# Patient Record
Sex: FEMALE | Race: WHITE | Employment: FULL TIME | URBAN - METROPOLITAN AREA
[De-identification: names, ages, dates, MRNs, and addresses within clinical notes are randomized per-mention and may not be internally consistent; named-entity substitution may affect disease eponyms.]

---

## 2017-01-11 ENCOUNTER — HOSPITAL ENCOUNTER (EMERGENCY)
Facility: HOSPITAL | Age: 42
Discharge: HOME | End: 2017-01-11
Attending: EMERGENCY MEDICINE
Payer: COMMERCIAL

## 2017-01-11 ENCOUNTER — APPOINTMENT (OUTPATIENT)
Dept: GENERAL RADIOLOGY | Facility: HOSPITAL | Age: 42
End: 2017-01-11
Attending: EMERGENCY MEDICINE
Payer: COMMERCIAL

## 2017-01-11 VITALS
BODY MASS INDEX: 31 KG/M2 | HEART RATE: 74 BPM | DIASTOLIC BLOOD PRESSURE: 97 MMHG | OXYGEN SATURATION: 100 % | TEMPERATURE: 97 F | SYSTOLIC BLOOD PRESSURE: 132 MMHG | RESPIRATION RATE: 16 BRPM | WEIGHT: 195 LBS

## 2017-01-11 DIAGNOSIS — S46.912A SHOULDER STRAIN, LEFT, INITIAL ENCOUNTER: ICD-10-CM
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
## 2017-01-11 PROCEDURE — 99283 EMERGENCY DEPT VISIT LOW MDM: CPT

## 2017-01-11 PROCEDURE — 73020 X-RAY EXAM OF SHOULDER: CPT

## 2017-01-11 RX ORDER — CYCLOBENZAPRINE HCL 10 MG
10 TABLET ORAL 3 TIMES DAILY PRN
Qty: 20 TABLET | Refills: 0 | Status: SHIPPED | OUTPATIENT
Start: 2017-01-11 | End: 2017-01-18

## 2017-01-11 RX ORDER — IBUPROFEN 600 MG/1
600 TABLET ORAL ONCE
Status: COMPLETED | OUTPATIENT
Start: 2017-01-11 | End: 2017-01-11
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
## 2017-04-30 ENCOUNTER — HOSPITAL ENCOUNTER (EMERGENCY)
Facility: HOSPITAL | Age: 42
Discharge: HOME | End: 2017-04-30
Attending: EMERGENCY MEDICINE
Payer: COMMERCIAL

## 2017-04-30 VITALS
HEIGHT: 66 IN | OXYGEN SATURATION: 97 % | SYSTOLIC BLOOD PRESSURE: 128 MMHG | RESPIRATION RATE: 16 BRPM | HEART RATE: 72 BPM | WEIGHT: 200 LBS | BODY MASS INDEX: 32.14 KG/M2 | DIASTOLIC BLOOD PRESSURE: 80 MMHG | TEMPERATURE: 98 F

## 2017-04-30 DIAGNOSIS — G43.809 OTHER MIGRAINE WITHOUT STATUS MIGRAINOSUS, NOT INTRACTABLE: Primary | ICD-10-CM

## 2017-04-30 PROCEDURE — 96375 TX/PRO/DX INJ NEW DRUG ADDON: CPT

## 2017-04-30 PROCEDURE — 96374 THER/PROPH/DIAG INJ IV PUSH: CPT

## 2017-04-30 PROCEDURE — 99284 EMERGENCY DEPT VISIT MOD MDM: CPT

## 2017-04-30 RX ORDER — METOCLOPRAMIDE HYDROCHLORIDE 5 MG/ML
10 INJECTION INTRAMUSCULAR; INTRAVENOUS ONCE
Status: COMPLETED | OUTPATIENT
Start: 2017-04-30 | End: 2017-04-30

## 2017-04-30 RX ORDER — KETOROLAC TROMETHAMINE 30 MG/ML
30 INJECTION, SOLUTION INTRAMUSCULAR; INTRAVENOUS ONCE
Status: COMPLETED | OUTPATIENT
Start: 2017-04-30 | End: 2017-04-30

## 2017-04-30 RX ORDER — DIPHENHYDRAMINE HYDROCHLORIDE 50 MG/ML
25 INJECTION INTRAMUSCULAR; INTRAVENOUS ONCE
Status: COMPLETED | OUTPATIENT
Start: 2017-04-30 | End: 2017-04-30

## 2017-05-01 NOTE — ED PROVIDER NOTES
Patient Seen in: [facility] Emergency Department    History   Patient presents with:  Headache (neurologic)    Stated Complaint: headache    HPI    41-year-old female with history of recurrent headaches presents with onset of a headache earlier toda %   O2 Device 04/30/17 5726 None (Room air)       Current:/83 mmHg  Pulse 74  Temp(Src) 98 °F (36.7 °C) (Oral)  Resp 16  Ht 167.6 cm (5' 6\")  Wt 90.719 kg  BMI 32.30 kg/m2  SpO2 98%  LMP 04/10/2017        Physical Exam    General:  Vitals as listed. appointment as soon as possible for a visit        Medications Prescribed:  Current Discharge Medication List

## 2017-05-01 NOTE — ED NOTES
STATES FEEL MUCH BETTER--RE-EVAL PER MD AND OK FOR DC WITH FU INST TO P;MD IN 1-2 DAYS AS DIRECTED.  IN AGREEMENT WITH POC.  QUESTIONS ANSWERED PER NICK TEJEDA AMB + GAIT WITH SPOUSE

## 2017-09-18 ENCOUNTER — APPOINTMENT (OUTPATIENT)
Dept: ULTRASOUND IMAGING | Facility: HOSPITAL | Age: 42
End: 2017-09-18
Attending: PHYSICIAN ASSISTANT
Payer: COMMERCIAL

## 2017-09-18 ENCOUNTER — HOSPITAL ENCOUNTER (EMERGENCY)
Facility: HOSPITAL | Age: 42
Discharge: HOME | End: 2017-09-18
Attending: EMERGENCY MEDICINE
Payer: COMMERCIAL

## 2017-09-18 VITALS
TEMPERATURE: 98 F | DIASTOLIC BLOOD PRESSURE: 68 MMHG | WEIGHT: 200 LBS | SYSTOLIC BLOOD PRESSURE: 112 MMHG | HEIGHT: 67 IN | RESPIRATION RATE: 18 BRPM | HEART RATE: 58 BPM | OXYGEN SATURATION: 98 % | BODY MASS INDEX: 31.39 KG/M2

## 2017-09-18 DIAGNOSIS — K80.20 CALCULUS OF GALLBLADDER WITHOUT CHOLECYSTITIS WITHOUT OBSTRUCTION: Primary | ICD-10-CM

## 2017-09-18 DIAGNOSIS — N30.00 ACUTE CYSTITIS WITHOUT HEMATURIA: ICD-10-CM

## 2017-09-18 LAB
ALBUMIN SERPL-MCNC: 3.2 G/DL (ref 3.5–4.8)
ALP LIVER SERPL-CCNC: 48 U/L (ref 37–98)
ALT SERPL-CCNC: 26 U/L (ref 14–54)
AST SERPL-CCNC: 18 U/L (ref 15–41)
BASOPHILS # BLD AUTO: 0.04 X10(3) UL (ref 0–0.1)
BASOPHILS NFR BLD AUTO: 0.5 %
BILIRUB SERPL-MCNC: 1.3 MG/DL (ref 0.1–2)
BILIRUB UR QL STRIP.AUTO: NEGATIVE
BUN BLD-MCNC: 20 MG/DL (ref 8–20)
CALCIUM BLD-MCNC: 8.6 MG/DL (ref 8.3–10.3)
CHLORIDE: 107 MMOL/L (ref 101–111)
CO2: 24 MMOL/L (ref 22–32)
COLOR UR AUTO: YELLOW
CREAT BLD-MCNC: 1.19 MG/DL (ref 0.55–1.02)
EOSINOPHIL # BLD AUTO: 0.14 X10(3) UL (ref 0–0.3)
EOSINOPHIL NFR BLD AUTO: 1.7 %
ERYTHROCYTE [DISTWIDTH] IN BLOOD BY AUTOMATED COUNT: 12.8 % (ref 11.5–16)
GLUCOSE BLD-MCNC: 129 MG/DL (ref 70–99)
GLUCOSE UR STRIP.AUTO-MCNC: NEGATIVE MG/DL
HCT VFR BLD AUTO: 41.2 % (ref 34–50)
HGB BLD-MCNC: 13.9 G/DL (ref 12–16)
IMMATURE GRANULOCYTE COUNT: 0.02 X10(3) UL (ref 0–1)
IMMATURE GRANULOCYTE RATIO %: 0.2 %
KETONES UR STRIP.AUTO-MCNC: NEGATIVE MG/DL
LIPASE: 194 U/L (ref 73–393)
LYMPHOCYTES # BLD AUTO: 3.42 X10(3) UL (ref 0.9–4)
LYMPHOCYTES NFR BLD AUTO: 40.8 %
M PROTEIN MFR SERPL ELPH: 7.2 G/DL (ref 6.1–8.3)
MCH RBC QN AUTO: 29.3 PG (ref 27–33.2)
MCHC RBC AUTO-ENTMCNC: 33.7 G/DL (ref 31–37)
MCV RBC AUTO: 86.9 FL (ref 81–100)
MONOCYTES # BLD AUTO: 0.51 X10(3) UL (ref 0.1–0.6)
MONOCYTES NFR BLD AUTO: 6.1 %
NEUTROPHIL ABS PRELIM: 4.26 X10 (3) UL (ref 1.3–6.7)
NEUTROPHILS # BLD AUTO: 4.26 X10(3) UL (ref 1.3–6.7)
NEUTROPHILS NFR BLD AUTO: 50.7 %
NITRITE UR QL STRIP.AUTO: NEGATIVE
PH UR STRIP.AUTO: 5 [PH] (ref 4.5–8)
PLATELET # BLD AUTO: 266 10(3)UL (ref 150–450)
POCT LOT NUMBER: NORMAL
POCT URINE PREGNANCY: NEGATIVE
POTASSIUM SERPL-SCNC: 3.6 MMOL/L (ref 3.6–5.1)
PROT UR STRIP.AUTO-MCNC: NEGATIVE MG/DL
RBC # BLD AUTO: 4.74 X10(6)UL (ref 3.8–5.1)
RED CELL DISTRIBUTION WIDTH-SD: 40.3 FL (ref 35.1–46.3)
SODIUM SERPL-SCNC: 139 MMOL/L (ref 136–144)
SP GR UR STRIP.AUTO: 1.03 (ref 1–1.03)
UROBILINOGEN UR STRIP.AUTO-MCNC: 2 MG/DL
WBC # BLD AUTO: 8.4 X10(3) UL (ref 4–13)

## 2017-09-18 PROCEDURE — 99285 EMERGENCY DEPT VISIT HI MDM: CPT

## 2017-09-18 PROCEDURE — 80053 COMPREHEN METABOLIC PANEL: CPT | Performed by: EMERGENCY MEDICINE

## 2017-09-18 PROCEDURE — 85025 COMPLETE CBC W/AUTO DIFF WBC: CPT | Performed by: EMERGENCY MEDICINE

## 2017-09-18 PROCEDURE — 96361 HYDRATE IV INFUSION ADD-ON: CPT

## 2017-09-18 PROCEDURE — 83690 ASSAY OF LIPASE: CPT

## 2017-09-18 PROCEDURE — 85025 COMPLETE CBC W/AUTO DIFF WBC: CPT

## 2017-09-18 PROCEDURE — 96374 THER/PROPH/DIAG INJ IV PUSH: CPT

## 2017-09-18 PROCEDURE — 87086 URINE CULTURE/COLONY COUNT: CPT | Performed by: EMERGENCY MEDICINE

## 2017-09-18 PROCEDURE — 76700 US EXAM ABDOM COMPLETE: CPT | Performed by: PHYSICIAN ASSISTANT

## 2017-09-18 PROCEDURE — 83690 ASSAY OF LIPASE: CPT | Performed by: EMERGENCY MEDICINE

## 2017-09-18 PROCEDURE — 99284 EMERGENCY DEPT VISIT MOD MDM: CPT

## 2017-09-18 PROCEDURE — 81025 URINE PREGNANCY TEST: CPT

## 2017-09-18 PROCEDURE — 80053 COMPREHEN METABOLIC PANEL: CPT

## 2017-09-18 PROCEDURE — 81001 URINALYSIS AUTO W/SCOPE: CPT | Performed by: EMERGENCY MEDICINE

## 2017-09-18 RX ORDER — SULFAMETHOXAZOLE AND TRIMETHOPRIM 800; 160 MG/1; MG/1
1 TABLET ORAL 2 TIMES DAILY
Qty: 6 TABLET | Refills: 0 | Status: SHIPPED | OUTPATIENT
Start: 2017-09-18 | End: 2017-09-21

## 2017-09-18 RX ORDER — KETOROLAC TROMETHAMINE 30 MG/ML
30 INJECTION, SOLUTION INTRAMUSCULAR; INTRAVENOUS ONCE
Status: COMPLETED | OUTPATIENT
Start: 2017-09-18 | End: 2017-09-18

## 2017-09-18 RX ORDER — HYDROMORPHONE HYDROCHLORIDE 1 MG/ML
0.5 INJECTION, SOLUTION INTRAMUSCULAR; INTRAVENOUS; SUBCUTANEOUS ONCE
Status: DISCONTINUED | OUTPATIENT
Start: 2017-09-18 | End: 2017-09-19

## 2017-09-18 RX ORDER — ONDANSETRON 2 MG/ML
4 INJECTION INTRAMUSCULAR; INTRAVENOUS ONCE
Status: DISCONTINUED | OUTPATIENT
Start: 2017-09-18 | End: 2017-09-19

## 2017-09-18 RX ORDER — SODIUM CHLORIDE 9 MG/ML
INJECTION, SOLUTION INTRAVENOUS CONTINUOUS
Status: DISCONTINUED | OUTPATIENT
Start: 2017-09-18 | End: 2017-09-19

## 2017-09-19 ENCOUNTER — OFFICE VISIT (OUTPATIENT)
Dept: SURGERY | Facility: CLINIC | Age: 42
End: 2017-09-19

## 2017-09-19 VITALS
TEMPERATURE: 98 F | BODY MASS INDEX: 32.49 KG/M2 | HEART RATE: 56 BPM | WEIGHT: 202.19 LBS | SYSTOLIC BLOOD PRESSURE: 122 MMHG | DIASTOLIC BLOOD PRESSURE: 78 MMHG | HEIGHT: 66 IN

## 2017-09-19 DIAGNOSIS — R07.81 COSTAL MARGIN PAIN: ICD-10-CM

## 2017-09-19 DIAGNOSIS — K80.10 CALCULUS OF GALLBLADDER WITH CHOLECYSTITIS WITHOUT BILIARY OBSTRUCTION, UNSPECIFIED CHOLECYSTITIS ACUITY: Primary | ICD-10-CM

## 2017-09-19 DIAGNOSIS — K76.89 FOCAL NODULAR HYPERPLASIA OF LIVER: ICD-10-CM

## 2017-09-19 PROCEDURE — 99204 OFFICE O/P NEW MOD 45 MIN: CPT | Performed by: COLON & RECTAL SURGERY

## 2017-09-19 NOTE — ED INITIAL ASSESSMENT (HPI)
Pt with right upper abd pain starting this am with workout. Continued to have pain through the day. Worse with eating. Denies vomiting/nausea.

## 2017-09-19 NOTE — ED PROVIDER NOTES
Patient Seen in: [facility] Emergency Department    History   Patient presents with:  Abdomen/Flank Pain (GI/)    Stated Complaint: ABD PAIN    HPI    CHIEF COMPLAINT: Right upper quadrant abdominal pain    HISTORY OF PRESENT ILLNESS: Patient is a multiple myeloma   • Diabetes Mother    • Diabetes Paternal Grandmother    • Heart Disorder Maternal Grandmother 76     CAD       Smoking status: Never Smoker                                                              Smokeless tobacco: Never Used warm and dry, no rashes. No jaundice. Brisk capillary refill  Musculoskeletal: neck is supple, no lymphadenopathy, non tender. no meningeal signs.        Extremities are symmetrical, full range of motion  Psychiatric: there is no agitation       ED Course MRI of the liver is recommended with Eovist. Differential considerations include benign and malignant hepatic neoplasms. 2. There is cholelithiasis. The gallbladder is contracted and the wall is mildly thickened.  Hepatobiliary scan recommended if there is

## 2017-09-19 NOTE — PATIENT INSTRUCTIONS
Assessment   Calculus of gallbladder with cholecystitis without biliary obstruction, unspecified cholecystitis acuity  (primary encounter diagnosis)  Costal margin pain  Focal nodular hyperplasia    The patient does have right upper quadrant abdominal pain constant right upper quadrant abdominal pain, she should notify the office immediately and/or return to the emergency department where she could be reassessed and undergo urgent surgery prior to her scheduled laparoscopic cholecystectomy date.   The risks o

## 2017-09-19 NOTE — H&P
New Patient Visit Note       Active Problems      1. Calculus of gallbladder with cholecystitis without biliary obstruction, unspecified cholecystitis acuity    2.  Costal margin pain        Chief Complaint   Right upper quadrant abdominal pain    History o nodular hyperplasia of the liver identified on an MRI in 2012. Allergies  Jules Bennett is allergic to biaxin [clarithromycin] and clarithromycin.     Past Medical / Surgical / Social / Family History    The past medical and past surgical history have been review swelling. Gastrointestinal: Positive for abdominal pain. Negative for abdominal distention, anal bleeding, blood in stool, constipation, diarrhea, nausea and vomiting. Genitourinary: Negative for difficulty urinating, dysuria, frequency and urgency. favoring a benign hepatic lesion. . These images are not available for comparison. Correlation of the   sonographic findings with the outside images is recommended which may circumvent the need for additional MRI imaging.            Dictated by: Bartolo Garcia cholecystitis. #3. The pancreas is obscured by overlying bowel gas.          Dictated by: Donald Loving MD on 9/18/2017 at 21:49       Approved by: Donald Loving MD             I have personally reviewed the images from the ultrasound of the abdo do not completely match classic symptomatic cholelithiasis or cholecystitis. The fact that this pain occurred suddenly while performing exercise leads me to believe this is more of a musculoskeletal issue.   It is not likely that the hepatic mass is causing leak, injury to other surrounding structures, need for additional surgeries in the future. No orders of the defined types were placed in this encounter. My total time with this patient was 60 minutes.   Greater than 50% of our visit was spent in co

## 2017-09-20 ENCOUNTER — HOSPITAL ENCOUNTER (INPATIENT)
Facility: HOSPITAL | Age: 42
LOS: 1 days | Discharge: HOME | DRG: 446 | End: 2017-09-21
Attending: EMERGENCY MEDICINE | Admitting: COLON & RECTAL SURGERY
Payer: COMMERCIAL

## 2017-09-20 ENCOUNTER — APPOINTMENT (OUTPATIENT)
Dept: CT IMAGING | Facility: HOSPITAL | Age: 42
DRG: 446 | End: 2017-09-20
Attending: PHYSICIAN ASSISTANT
Payer: COMMERCIAL

## 2017-09-20 ENCOUNTER — APPOINTMENT (OUTPATIENT)
Dept: NUCLEAR MEDICINE | Facility: HOSPITAL | Age: 42
DRG: 446 | End: 2017-09-20
Attending: PHYSICIAN ASSISTANT
Payer: COMMERCIAL

## 2017-09-20 DIAGNOSIS — K80.50 BILIARY COLIC: Primary | ICD-10-CM

## 2017-09-20 LAB
ALBUMIN SERPL-MCNC: 3.2 G/DL (ref 3.5–4.8)
ALP LIVER SERPL-CCNC: 43 U/L (ref 37–98)
ALT SERPL-CCNC: 25 U/L (ref 14–54)
AST SERPL-CCNC: 17 U/L (ref 15–41)
BASOPHILS # BLD AUTO: 0.03 X10(3) UL (ref 0–0.1)
BASOPHILS NFR BLD AUTO: 0.4 %
BILIRUB SERPL-MCNC: 1.4 MG/DL (ref 0.1–2)
BILIRUB UR QL STRIP.AUTO: NEGATIVE
BUN BLD-MCNC: 15 MG/DL (ref 8–20)
CALCIUM BLD-MCNC: 9.3 MG/DL (ref 8.3–10.3)
CHLORIDE: 109 MMOL/L (ref 101–111)
CLARITY UR REFRACT.AUTO: CLEAR
CO2: 23 MMOL/L (ref 22–32)
COLOR UR AUTO: YELLOW
CREAT BLD-MCNC: 1.16 MG/DL (ref 0.55–1.02)
EOSINOPHIL # BLD AUTO: 0.12 X10(3) UL (ref 0–0.3)
EOSINOPHIL NFR BLD AUTO: 1.8 %
ERYTHROCYTE [DISTWIDTH] IN BLOOD BY AUTOMATED COUNT: 12.8 % (ref 11.5–16)
GLUCOSE BLD-MCNC: 90 MG/DL (ref 70–99)
GLUCOSE UR STRIP.AUTO-MCNC: NEGATIVE MG/DL
HCT VFR BLD AUTO: 42.9 % (ref 34–50)
HGB BLD-MCNC: 14.5 G/DL (ref 12–16)
HYALINE CASTS #/AREA URNS AUTO: PRESENT /LPF
IMMATURE GRANULOCYTE COUNT: 0.01 X10(3) UL (ref 0–1)
IMMATURE GRANULOCYTE RATIO %: 0.1 %
KETONES UR STRIP.AUTO-MCNC: NEGATIVE MG/DL
LEUKOCYTE ESTERASE UR QL STRIP.AUTO: NEGATIVE
LIPASE: 218 U/L (ref 73–393)
LYMPHOCYTES # BLD AUTO: 2.07 X10(3) UL (ref 0.9–4)
LYMPHOCYTES NFR BLD AUTO: 30.4 %
M PROTEIN MFR SERPL ELPH: 7.4 G/DL (ref 6.1–8.3)
MCH RBC QN AUTO: 29.5 PG (ref 27–33.2)
MCHC RBC AUTO-ENTMCNC: 33.8 G/DL (ref 31–37)
MCV RBC AUTO: 87.2 FL (ref 81–100)
MONOCYTES # BLD AUTO: 0.42 X10(3) UL (ref 0.1–0.6)
MONOCYTES NFR BLD AUTO: 6.2 %
NEUTROPHIL ABS PRELIM: 4.17 X10 (3) UL (ref 1.3–6.7)
NEUTROPHILS # BLD AUTO: 4.17 X10(3) UL (ref 1.3–6.7)
NEUTROPHILS NFR BLD AUTO: 61.1 %
NITRITE UR QL STRIP.AUTO: NEGATIVE
PH UR STRIP.AUTO: 5 [PH] (ref 4.5–8)
PLATELET # BLD AUTO: 258 10(3)UL (ref 150–450)
POCT LOT NUMBER: NORMAL
POCT URINE PREGNANCY: NEGATIVE
POTASSIUM SERPL-SCNC: 4.1 MMOL/L (ref 3.6–5.1)
PROT UR STRIP.AUTO-MCNC: NEGATIVE MG/DL
RBC # BLD AUTO: 4.92 X10(6)UL (ref 3.8–5.1)
RED CELL DISTRIBUTION WIDTH-SD: 40.3 FL (ref 35.1–46.3)
SODIUM SERPL-SCNC: 141 MMOL/L (ref 136–144)
SP GR UR STRIP.AUTO: 1.01 (ref 1–1.03)
UROBILINOGEN UR STRIP.AUTO-MCNC: <2 MG/DL
WBC # BLD AUTO: 6.8 X10(3) UL (ref 4–13)

## 2017-09-20 PROCEDURE — 78227 HEPATOBIL SYST IMAGE W/DRUG: CPT | Performed by: PHYSICIAN ASSISTANT

## 2017-09-20 PROCEDURE — 99222 1ST HOSP IP/OBS MODERATE 55: CPT | Performed by: COLON & RECTAL SURGERY

## 2017-09-20 PROCEDURE — 74177 CT ABD & PELVIS W/CONTRAST: CPT | Performed by: PHYSICIAN ASSISTANT

## 2017-09-20 RX ORDER — NAPROXEN 500 MG/1
500 TABLET ORAL 2 TIMES DAILY WITH MEALS
Status: DISCONTINUED | OUTPATIENT
Start: 2017-09-20 | End: 2017-09-21

## 2017-09-20 RX ORDER — ACETAMINOPHEN 500 MG
1000 TABLET ORAL EVERY 8 HOURS
Status: DISCONTINUED | OUTPATIENT
Start: 2017-09-20 | End: 2017-09-21

## 2017-09-20 RX ORDER — TRAMADOL HYDROCHLORIDE 50 MG/1
50 TABLET ORAL EVERY 6 HOURS PRN
Status: DISCONTINUED | OUTPATIENT
Start: 2017-09-20 | End: 2017-09-21

## 2017-09-20 RX ORDER — HYDROMORPHONE HYDROCHLORIDE 1 MG/ML
0.5 INJECTION, SOLUTION INTRAMUSCULAR; INTRAVENOUS; SUBCUTANEOUS EVERY 2 HOUR PRN
Status: DISCONTINUED | OUTPATIENT
Start: 2017-09-20 | End: 2017-09-21

## 2017-09-20 NOTE — PROGRESS NOTES
09/20/17 1647   Clinical Encounter Type   Visited With Patient and family together   Continue Visiting No   Referral From Patient   Referral To    Judaism Encounters   Judaism Needs Prayer   Spiritual Requests During Visit / Hospitalization

## 2017-09-20 NOTE — H&P
[facility]  H&P    Carlton Jennings Patient Status:  Inpatient    1975 MRN [id number]   [facility] 3SW-A Attending Oscar Park MD   Hosp Day # 0 PCP BLESSING Rodriguez       History of Present Illness:  Carlton Jennings is a a(n) postprandial abdominal pain. She states it does not feel like a muscle strain. The patient's past medical history consists of hyperlipidemia and migraines. Her past surgical history consists of wisdom teeth extraction. The patient does not smoke.   Sh bone/joint symptoms  Neurological:  Negative for gait disturbance  Psychiatric:  Negative for inappropriate interaction and psychiatric symptoms  Respiratory:  Negative for cough, dyspnea and wheezing      Physical Exam:  Blood pressure 120/76, pulse 55, t hours.    Imaging:  Study Result   PROCEDURE:  US ABDOMEN COMPLETE (CPT=76700)     COMPARISON:  None. INDICATIONS:  ABD PAIN     TECHNIQUE:  Real time gray-scale ultrasound was used to evaluate the abdomen.   The exam includes images of the liver, gallb TECHNIQUE:  Tc99m labeled mebrofenin was injected IV, and dynamic images of the abdomen were obtained in the anterior projection for one hour.   This was followed by IV administration of CCK over 40 minutes followed by additional total of 45 minutes of - we will proceed with CT scan of the abdomen and pelvis to exclude any other causes of her persistent abdominal pain   3. The patient may have a low fat diet   4. GI and DVT prophylaxis  5.  Activity as tolerated      Time spent on counseling/coordination 60%.  Her lab work on admission continues to be all normal.  Given her normal HIDA scan I still do not believe that her gallbladder is the source of her pain.   I again explained the patient that I did not want to remove her gallbladder and have her awaken

## 2017-09-20 NOTE — ED PROVIDER NOTES
Patient Seen in: [facility] Emergency Department    History   Patient presents with:  Abdomen/Flank Pain (GI/)    Stated Complaint: ABD PAIN    HPI    60-year-old female presents for evaluation of right sided abdominal pain.   Patient developed malachi Pulse 62   Temp 98.7 °F (37.1 °C) (Temporal)   Resp 18   Ht 167.6 cm (5' 6\")   Wt 90.7 kg   LMP 08/28/2017   SpO2 98%   BMI 32.28 kg/m²         Physical Exam    General: Alert, oriented, no apparent distress  HEENT: Atraumatic, normocephalic.   Pupils eq disposition on file for this visit. Follow-up:  No follow-up provider specified.     Medications Prescribed:  Current Discharge Medication List

## 2017-09-20 NOTE — ED NOTES
Spoke with Carrie Lennon in [address] [place], [place] [address]a [place], patient ate at 7 am so HIDA scan must be postponed until 1 pm. MD aware.

## 2017-09-20 NOTE — ED INITIAL ASSESSMENT (HPI)
Patient has sharp pain to the right of her umbilicus. She was here Monday for the same complaint. She was discharged and had follow-up with GI and they ruled out a gallbladder issue. She has an appt.  With her PMD today but her pain was too bad this am.

## 2017-09-21 ENCOUNTER — TELEPHONE (OUTPATIENT)
Dept: SURGERY | Facility: CLINIC | Age: 42
End: 2017-09-21

## 2017-09-21 VITALS
HEART RATE: 57 BPM | BODY MASS INDEX: 32.14 KG/M2 | SYSTOLIC BLOOD PRESSURE: 117 MMHG | RESPIRATION RATE: 18 BRPM | DIASTOLIC BLOOD PRESSURE: 67 MMHG | WEIGHT: 200 LBS | OXYGEN SATURATION: 96 % | TEMPERATURE: 99 F | HEIGHT: 66 IN

## 2017-09-21 DIAGNOSIS — K80.18 CALCULUS OF GALLBLADDER WITH OTHER CHOLECYSTITIS WITHOUT OBSTRUCTION: Primary | ICD-10-CM

## 2017-09-21 PROCEDURE — 99232 SBSQ HOSP IP/OBS MODERATE 35: CPT | Performed by: COLON & RECTAL SURGERY

## 2017-09-21 RX ORDER — NAPROXEN 500 MG/1
500 TABLET ORAL 2 TIMES DAILY WITH MEALS
Qty: 30 TABLET | Refills: 0 | Status: SHIPPED
Start: 2017-09-21

## 2017-09-21 RX ORDER — ACETAMINOPHEN 500 MG
1000 TABLET ORAL EVERY 8 HOURS
Qty: 30 TABLET | Refills: 0 | Status: SHIPPED
Start: 2017-09-21

## 2017-09-21 RX ORDER — TRAMADOL HYDROCHLORIDE 50 MG/1
50 TABLET ORAL AS NEEDED
Qty: 30 TABLET | Refills: 0 | Status: SHIPPED | OUTPATIENT
Start: 2017-09-21 | End: 2017-10-09

## 2017-09-21 NOTE — PROGRESS NOTES
[facility]  Progress Note    Darius Ganser Patient Status:  Inpatient    1975 MRN [id number]   [facility] 3SW-A Attending Rhiannon Mayes MD   Hosp Day # 1 PCP BLESSING Tyson     Subjective:   The patient states pain is abou ONLY)(CPT=74177)     COMPARISON:  [facility] US ABDOMEN COMPLETE (CPT=76700), 9/18/2017, 21:02.      INDICATIONS:  right sided and periumbilical abdominal pain     TECHNIQUE:  CT scanning was performed from the dome of the diaphragm to the pubic symphysis wit no free air free fluid. ABDOMINAL WALL:  Normal.  No mass or hernia. URINARY BLADDER:  Normal.  No visible focal wall thickening, lesion, or calculus. PELVIC NODES:  Normal.  No adenopathy. PELVIC ORGANS:  Normal.  No visible mass.   Pelvic organs Tylenol and either acetaminophen or naproxen sodium for pain control. We will give her a prescription for tramadol for breakthrough pain. I spent  30  minutes face to face with the patient.  More than 50% of that time was spent counseling the patient an

## 2017-09-22 ENCOUNTER — SURGERY (OUTPATIENT)
Age: 42
End: 2017-09-22

## 2017-09-22 ENCOUNTER — APPOINTMENT (OUTPATIENT)
Dept: GENERAL RADIOLOGY | Facility: HOSPITAL | Age: 42
End: 2017-09-22
Attending: COLON & RECTAL SURGERY
Payer: COMMERCIAL

## 2017-09-22 ENCOUNTER — HOSPITAL ENCOUNTER (OUTPATIENT)
Facility: HOSPITAL | Age: 42
Setting detail: HOSPITAL OUTPATIENT SURGERY
Discharge: HOME | End: 2017-09-22
Attending: COLON & RECTAL SURGERY | Admitting: COLON & RECTAL SURGERY
Payer: COMMERCIAL

## 2017-09-22 ENCOUNTER — ANESTHESIA EVENT (OUTPATIENT)
Dept: SURGERY | Facility: HOSPITAL | Age: 42
End: 2017-09-22
Payer: COMMERCIAL

## 2017-09-22 ENCOUNTER — ANESTHESIA (OUTPATIENT)
Dept: SURGERY | Facility: HOSPITAL | Age: 42
End: 2017-09-22
Payer: COMMERCIAL

## 2017-09-22 VITALS
SYSTOLIC BLOOD PRESSURE: 107 MMHG | BODY MASS INDEX: 32.24 KG/M2 | TEMPERATURE: 99 F | WEIGHT: 200.63 LBS | HEIGHT: 66 IN | RESPIRATION RATE: 18 BRPM | OXYGEN SATURATION: 94 % | HEART RATE: 62 BPM | DIASTOLIC BLOOD PRESSURE: 70 MMHG

## 2017-09-22 DIAGNOSIS — K80.10 CALCULUS OF GALLBLADDER WITH CHOLECYSTITIS WITHOUT BILIARY OBSTRUCTION, UNSPECIFIED CHOLECYSTITIS ACUITY: ICD-10-CM

## 2017-09-22 LAB
POCT LOT NUMBER: NORMAL
POCT URINE PREGNANCY: NEGATIVE

## 2017-09-22 PROCEDURE — BF10YZZ FLUOROSCOPY OF BILE DUCTS USING OTHER CONTRAST: ICD-10-PCS

## 2017-09-22 PROCEDURE — 76000 FLUOROSCOPY <1 HR PHYS/QHP: CPT | Performed by: COLON & RECTAL SURGERY

## 2017-09-22 PROCEDURE — 0FT44ZZ RESECTION OF GALLBLADDER, PERCUTANEOUS ENDOSCOPIC APPROACH: ICD-10-PCS

## 2017-09-22 PROCEDURE — 47563 LAPARO CHOLECYSTECTOMY/GRAPH: CPT | Performed by: COLON & RECTAL SURGERY

## 2017-09-22 RX ORDER — ONDANSETRON 2 MG/ML
4 INJECTION INTRAMUSCULAR; INTRAVENOUS AS NEEDED
Status: DISCONTINUED | OUTPATIENT
Start: 2017-09-22 | End: 2017-09-22

## 2017-09-22 RX ORDER — MIDAZOLAM HYDROCHLORIDE 1 MG/ML
1 INJECTION INTRAMUSCULAR; INTRAVENOUS EVERY 5 MIN PRN
Status: DISCONTINUED | OUTPATIENT
Start: 2017-09-22 | End: 2017-09-22

## 2017-09-22 RX ORDER — CEFOXITIN 2 G/1
INJECTION, POWDER, FOR SOLUTION INTRAVENOUS
Status: DISCONTINUED | OUTPATIENT
Start: 2017-09-22 | End: 2017-09-22 | Stop reason: HOSPADM

## 2017-09-22 RX ORDER — HYDROMORPHONE HYDROCHLORIDE 1 MG/ML
0.4 INJECTION, SOLUTION INTRAMUSCULAR; INTRAVENOUS; SUBCUTANEOUS EVERY 5 MIN PRN
Status: DISCONTINUED | OUTPATIENT
Start: 2017-09-22 | End: 2017-09-22

## 2017-09-22 RX ORDER — SCOLOPAMINE TRANSDERMAL SYSTEM 1 MG/1
1 PATCH, EXTENDED RELEASE TRANSDERMAL ONCE
Status: DISCONTINUED | OUTPATIENT
Start: 2017-09-22 | End: 2017-09-22

## 2017-09-22 RX ORDER — HEPARIN SODIUM 5000 [USP'U]/ML
5000 INJECTION, SOLUTION INTRAVENOUS; SUBCUTANEOUS ONCE
Status: COMPLETED | OUTPATIENT
Start: 2017-09-22 | End: 2017-09-22

## 2017-09-22 RX ORDER — SODIUM CHLORIDE, SODIUM LACTATE, POTASSIUM CHLORIDE, CALCIUM CHLORIDE 600; 310; 30; 20 MG/100ML; MG/100ML; MG/100ML; MG/100ML
INJECTION, SOLUTION INTRAVENOUS CONTINUOUS
Status: DISCONTINUED | OUTPATIENT
Start: 2017-09-22 | End: 2017-09-22

## 2017-09-22 RX ORDER — NALOXONE HYDROCHLORIDE 0.4 MG/ML
80 INJECTION, SOLUTION INTRAMUSCULAR; INTRAVENOUS; SUBCUTANEOUS AS NEEDED
Status: DISCONTINUED | OUTPATIENT
Start: 2017-09-22 | End: 2017-09-22

## 2017-09-22 RX ORDER — MEPERIDINE HYDROCHLORIDE 25 MG/ML
12.5 INJECTION INTRAMUSCULAR; INTRAVENOUS; SUBCUTANEOUS AS NEEDED
Status: DISCONTINUED | OUTPATIENT
Start: 2017-09-22 | End: 2017-09-22

## 2017-09-22 RX ORDER — HYDROCODONE BITARTRATE AND ACETAMINOPHEN 5; 325 MG/1; MG/1
1-2 TABLET ORAL
Qty: 20 TABLET | Refills: 0 | Status: SHIPPED | OUTPATIENT
Start: 2017-09-22 | End: 2017-10-09

## 2017-09-22 RX ORDER — HYDROMORPHONE HYDROCHLORIDE 1 MG/ML
INJECTION, SOLUTION INTRAMUSCULAR; INTRAVENOUS; SUBCUTANEOUS
Status: COMPLETED
Start: 2017-09-22 | End: 2017-09-22

## 2017-09-22 RX ORDER — HYDROCODONE BITARTRATE AND ACETAMINOPHEN 10; 325 MG/1; MG/1
1 TABLET ORAL AS NEEDED
Status: COMPLETED | OUTPATIENT
Start: 2017-09-22 | End: 2017-09-22

## 2017-09-22 RX ORDER — HYDROCODONE BITARTRATE AND ACETAMINOPHEN 10; 325 MG/1; MG/1
2 TABLET ORAL AS NEEDED
Status: COMPLETED | OUTPATIENT
Start: 2017-09-22 | End: 2017-09-22

## 2017-09-22 RX ORDER — METOCLOPRAMIDE HYDROCHLORIDE 5 MG/ML
10 INJECTION INTRAMUSCULAR; INTRAVENOUS AS NEEDED
Status: DISCONTINUED | OUTPATIENT
Start: 2017-09-22 | End: 2017-09-22

## 2017-09-22 RX ORDER — BUPIVACAINE HYDROCHLORIDE AND EPINEPHRINE 5; 5 MG/ML; UG/ML
INJECTION, SOLUTION EPIDURAL; INTRACAUDAL; PERINEURAL AS NEEDED
Status: DISCONTINUED | OUTPATIENT
Start: 2017-09-22 | End: 2017-09-22 | Stop reason: HOSPADM

## 2017-09-22 RX ORDER — ONDANSETRON 2 MG/ML
INJECTION INTRAMUSCULAR; INTRAVENOUS
Status: COMPLETED
Start: 2017-09-22 | End: 2017-09-22

## 2017-09-22 NOTE — INTERVAL H&P NOTE
Pre-op Diagnosis: Calculus of gallbladder with cholecystitis without biliary obstruction, unspecified cholecystitis acuity [K80.00]    The above referenced H&P was reviewed by Panda Dukes MD on 9/22/2017, the patient was examined and no significa

## 2017-09-22 NOTE — OPERATIVE REPORT
[facility]  Operative Note    Isadora Taylor Location: OR   CSN [id number] MRN [id number]   Admission Date 9/22/2017 Operation Date 9/22/2017   Attending Physician Kit Penny MD Operating Physician Shimon Lane MD       Patient Name: position. General endotracheal anesthesia was administered. Preoperative antibiotics were given. The abdomen was prepped and draped in the usual sterile fashion. A time-out was performed. A transverse supraumbilical incision was made.   The base  the [facility] towards the patient side, and the cystic artery was divided with the Endoshears. Next, the gallbladder was elevated from the gallbladder fossa using electrocautery.  Once the gallbladder was elevated from the gallbladder fossa, it was extracted from the umb

## 2017-09-22 NOTE — ANESTHESIA POSTPROCEDURE EVALUATION
[address] [facility] Patient Status:  Hospital Outpatient Surgery   Age/Gender 40year old female MRN [id number]   Location [address] [facility] Attending Dotty Dubin, MD   Hosp Day # 0 PCP BLESSING Walsh

## 2017-09-22 NOTE — H&P (VIEW-ONLY)
[facility]  H&P    Tom Melchor Patient Status:  Inpatient    1975 MRN [id number]   [facility] 3SW-A Attending Sampson Brooke MD   Hosp Day # 0 PCP BLESSING Shirley       History of Present Illness:  Tom Melchor is a a(n) postprandial abdominal pain. She states it does not feel like a muscle strain. The patient's past medical history consists of hyperlipidemia and migraines. Her past surgical history consists of wisdom teeth extraction. The patient does not smoke.   Sh bone/joint symptoms  Neurological:  Negative for gait disturbance  Psychiatric:  Negative for inappropriate interaction and psychiatric symptoms  Respiratory:  Negative for cough, dyspnea and wheezing      Physical Exam:  Blood pressure 120/76, pulse 55, t hours.    Imaging:  Study Result   PROCEDURE:  US ABDOMEN COMPLETE (CPT=76700)     COMPARISON:  None. INDICATIONS:  ABD PAIN     TECHNIQUE:  Real time gray-scale ultrasound was used to evaluate the abdomen.   The exam includes images of the liver, gallb TECHNIQUE:  Tc99m labeled mebrofenin was injected IV, and dynamic images of the abdomen were obtained in the anterior projection for one hour.   This was followed by IV administration of CCK over 40 minutes followed by additional total of 45 minutes of - we will proceed with CT scan of the abdomen and pelvis to exclude any other causes of her persistent abdominal pain   3. The patient may have a low fat diet   4. GI and DVT prophylaxis  5.  Activity as tolerated      Time spent on counseling/coordination 60%.  Her lab work on admission continues to be all normal.  Given her normal HIDA scan I still do not believe that her gallbladder is the source of her pain.   I again explained the patient that I did not want to remove her gallbladder and have her awaken

## 2017-09-22 NOTE — ANESTHESIA PREPROCEDURE EVALUATION
PRE-OP EVALUATION    Patient Name: Ajay Yeboah    Pre-op Diagnosis: Calculus of gallbladder with cholecystitis without biliary obstruction, unspecified cholecystitis acuity [K80.00]    Procedure(s):  LAPAROSCOPIC CHOLECYSTECTOMY WITH CHOLANGIOGRAM    Sherron Never Smoker    Smokeless tobacco: Never Used    Alcohol use Yes    Comment: occ       Drug use: No     Available pre-op labs reviewed.     Lab Results  Component Value Date   WBC 6.8 09/20/2017   RBC 4.92 09/20/2017   HGB 14.5 09/20/2017   HCT 42.9 09/20/2

## 2017-09-22 NOTE — PROGRESS NOTES
Patient discharged to home this afternoon. Reviewed home instructions with patient at bedside prior to depart. Patient to continue on blad diet for tonight and to remain NPO p 2300. Plan for surgery tomorrow afternoon at 1530 with Dr Edmond Lopez.   Script for tra

## 2017-09-25 ENCOUNTER — TELEPHONE (OUTPATIENT)
Dept: SURGERY | Facility: CLINIC | Age: 42
End: 2017-09-25

## 2017-09-25 NOTE — TELEPHONE ENCOUNTER
Pt had gary Sommer 9/22 and wondered if she can go back to work in 1 week instead of 2 weeks which the paperwork shows. 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  It is up to her if she wants to go back in 1 week and must be off any narcotic

## 2017-10-09 ENCOUNTER — OFFICE VISIT (OUTPATIENT)
Dept: SURGERY | Facility: CLINIC | Age: 42
End: 2017-10-09

## 2017-10-09 VITALS
BODY MASS INDEX: 30.86 KG/M2 | HEART RATE: 67 BPM | TEMPERATURE: 99 F | RESPIRATION RATE: 18 BRPM | HEIGHT: 66 IN | DIASTOLIC BLOOD PRESSURE: 74 MMHG | SYSTOLIC BLOOD PRESSURE: 110 MMHG | WEIGHT: 192 LBS

## 2017-10-09 DIAGNOSIS — Z09 FOLLOW-UP EXAMINATION AFTER ABDOMINAL SURGERY: Primary | ICD-10-CM

## 2017-10-09 DIAGNOSIS — K80.20 CALCULUS OF GALLBLADDER WITHOUT CHOLECYSTITIS WITHOUT OBSTRUCTION: ICD-10-CM

## 2017-10-09 DIAGNOSIS — Z90.49 HISTORY OF CHOLECYSTECTOMY: ICD-10-CM

## 2017-10-09 PROBLEM — R07.81 COSTAL MARGIN PAIN: Status: RESOLVED | Noted: 2017-09-19 | Resolved: 2017-10-09

## 2017-10-09 PROBLEM — K80.50 BILIARY COLIC: Status: RESOLVED | Noted: 2017-09-20 | Resolved: 2017-10-09

## 2017-10-09 PROCEDURE — 99024 POSTOP FOLLOW-UP VISIT: CPT | Performed by: COLON & RECTAL SURGERY

## 2017-10-09 NOTE — PATIENT INSTRUCTIONS
Assessment   Follow-up examination after abdominal surgery  (primary encounter diagnosis)  History of cholecystectomy  Calculus of gallbladder without cholecystitis without obstruction    The patient is doing well after lap scopic cholecystectomy.     Patho

## 2018-01-29 NOTE — PROGRESS NOTES
Post Operative Visit Note       Active Problems  1. Follow-up examination after abdominal surgery    2. History of cholecystectomy    3.  Calculus of gallbladder without cholecystitis without obstruction         Chief Complaint   Follow-up after arthroscopi Years of education:                 Number of children:               Social History Main Topics    Smoking status: Never Smoker                                                                Smokeless tobacco: Never Used                        Alcohol use Well nourished, well kempt  Eyes: PERRL, no scleral icterus   Cardiac: Normal rate, Regular rhythm, no murmurs  Respiratory: No respiratory distress, breath sounds clear bilaterally  Abdominal: Soft, Nontender, Nondistended, Steri-Strips were removed from abdominal pain, jaundice, vomiting, inability to have a bowel movement, she should notify the office immediately. No orders of the defined types were placed in this encounter. Imaging & Referrals   None    Follow Up  No Follow-up on file.     Keanu Gilliam Patient/Caregiver provided printed discharge information.

## 2019-11-18 ENCOUNTER — HOSPITAL ENCOUNTER (EMERGENCY)
Facility: HOSPITAL | Age: 44
Discharge: HOME | End: 2019-11-18
Attending: EMERGENCY MEDICINE
Payer: COMMERCIAL

## 2019-11-18 VITALS
WEIGHT: 225 LBS | HEART RATE: 66 BPM | OXYGEN SATURATION: 99 % | SYSTOLIC BLOOD PRESSURE: 140 MMHG | RESPIRATION RATE: 18 BRPM | HEIGHT: 67 IN | DIASTOLIC BLOOD PRESSURE: 80 MMHG | BODY MASS INDEX: 35.31 KG/M2 | TEMPERATURE: 98 F

## 2019-11-18 DIAGNOSIS — H61.031 CHONDRITIS OF AURICLE, RIGHT: Primary | ICD-10-CM

## 2019-11-18 PROCEDURE — 80053 COMPREHEN METABOLIC PANEL: CPT | Performed by: EMERGENCY MEDICINE

## 2019-11-18 PROCEDURE — 36415 COLL VENOUS BLD VENIPUNCTURE: CPT

## 2019-11-18 PROCEDURE — 85025 COMPLETE CBC W/AUTO DIFF WBC: CPT | Performed by: EMERGENCY MEDICINE

## 2019-11-18 PROCEDURE — 99283 EMERGENCY DEPT VISIT LOW MDM: CPT

## 2019-11-18 RX ORDER — CEFDINIR 300 MG/1
300 CAPSULE ORAL 2 TIMES DAILY
COMMUNITY

## 2019-11-18 RX ORDER — CIPROFLOXACIN 500 MG/1
500 TABLET, FILM COATED ORAL 2 TIMES DAILY
Qty: 20 TABLET | Refills: 0 | Status: SHIPPED | OUTPATIENT
Start: 2019-11-18 | End: 2019-11-28

## 2019-11-19 NOTE — ED INITIAL ASSESSMENT (HPI)
Pt states right ear swelling started on Wednesday. Pt has been to urgent care x2. Pt dx with cellulitis and placed on antibiotics. Pt reports not getting better. Pt reports still in a great deal of pain.   Pt was given an injection of rocephin 11/17, pt

## 2019-11-19 NOTE — ED PROVIDER NOTES
Patient Seen in: [facility] Emergency Department      History   Patient presents with:  Ear Problem Pain (neurosensory)    Stated Complaint: ear swelling    HPI    Patient is a 81-year-old female, presenting for evaluation of pain and redness to her SpO2 99 %   O2 Device None (Room air)       Current:/80   Pulse 66   Temp 97.6 °F (36.4 °C) (Oral)   Resp 18   Ht 170.2 cm (5' 7\")   Wt 102.1 kg   LMP 11/17/2019   SpO2 99%   BMI 35.24 kg/m²       Physical Exam    Vital signs noted.    GENERAL: Patie Please view results for these tests on the individual orders. CBC W/ DIFFERENTIAL          MDM     Patient was placed on a cart, an IV was established, and blood was drawn and sent to the laboratory for further evaluation.      Results of the laboratory s

## 2022-03-22 ENCOUNTER — HOSPITAL ENCOUNTER (EMERGENCY)
Facility: HOSPITAL | Age: 47
Discharge: HOME | End: 2022-03-22
Attending: EMERGENCY MEDICINE
Payer: COMMERCIAL

## 2022-03-22 ENCOUNTER — APPOINTMENT (OUTPATIENT)
Dept: CT IMAGING | Facility: HOSPITAL | Age: 47
End: 2022-03-22
Attending: EMERGENCY MEDICINE
Payer: COMMERCIAL

## 2022-03-22 VITALS
SYSTOLIC BLOOD PRESSURE: 116 MMHG | TEMPERATURE: 98 F | DIASTOLIC BLOOD PRESSURE: 78 MMHG | BODY MASS INDEX: 35.36 KG/M2 | RESPIRATION RATE: 18 BRPM | OXYGEN SATURATION: 96 % | WEIGHT: 220 LBS | HEART RATE: 58 BPM | HEIGHT: 66 IN

## 2022-03-22 DIAGNOSIS — K57.92 ACUTE DIVERTICULITIS: Primary | ICD-10-CM

## 2022-03-22 LAB
ALBUMIN SERPL-MCNC: 3.5 G/DL (ref 3.4–5)
ALBUMIN/GLOB SERPL: 0.9 {RATIO} (ref 1–2)
ALP LIVER SERPL-CCNC: 49 U/L
ALT SERPL-CCNC: 30 U/L
ANION GAP SERPL CALC-SCNC: 3 MMOL/L (ref 0–18)
AST SERPL-CCNC: 18 U/L (ref 15–37)
B-HCG UR QL: NEGATIVE
BASOPHILS NFR BLD AUTO: 0.4 %
BILIRUB SERPL-MCNC: 2.1 MG/DL (ref 0.1–2)
BILIRUB UR QL STRIP.AUTO: NEGATIVE
BUN BLD-MCNC: 12 MG/DL (ref 7–18)
CALCIUM BLD-MCNC: 9.1 MG/DL (ref 8.5–10.1)
CHLORIDE SERPL-SCNC: 107 MMOL/L (ref 98–112)
CO2 SERPL-SCNC: 26 MMOL/L (ref 21–32)
COLOR UR AUTO: YELLOW
CREAT BLD-MCNC: 1.08 MG/DL
EOSINOPHIL # BLD AUTO: 0.17 X10(3) UL (ref 0–0.7)
EOSINOPHIL NFR BLD AUTO: 1.4 %
ERYTHROCYTE [DISTWIDTH] IN BLOOD BY AUTOMATED COUNT: 13.9 %
GLOBULIN PLAS-MCNC: 3.9 G/DL (ref 2.8–4.4)
GLUCOSE BLD-MCNC: 109 MG/DL (ref 70–99)
GLUCOSE UR STRIP.AUTO-MCNC: NEGATIVE MG/DL
HCT VFR BLD AUTO: 42.2 %
HGB BLD-MCNC: 14.3 G/DL
IMM GRANULOCYTES # BLD AUTO: 0.05 X10(3) UL (ref 0–1)
IMM GRANULOCYTES NFR BLD: 0.4 %
KETONES UR STRIP.AUTO-MCNC: NEGATIVE MG/DL
LEUKOCYTE ESTERASE UR QL STRIP.AUTO: NEGATIVE
LIPASE SERPL-CCNC: 129 U/L (ref 73–393)
LYMPHOCYTES # BLD AUTO: 2.46 X10(3) UL (ref 1–4)
LYMPHOCYTES NFR BLD AUTO: 19.6 %
MCH RBC QN AUTO: 28.1 PG (ref 26–34)
MCHC RBC AUTO-ENTMCNC: 33.9 G/DL (ref 31–37)
MCV RBC AUTO: 82.9 FL
MONOCYTES # BLD AUTO: 0.87 X10(3) UL (ref 0.1–1)
MONOCYTES NFR BLD AUTO: 6.9 %
NEUTROPHILS # BLD AUTO: 8.97 X10 (3) UL (ref 1.5–7.7)
NEUTROPHILS # BLD AUTO: 8.97 X10(3) UL (ref 1.5–7.7)
NEUTROPHILS NFR BLD AUTO: 71.3 %
NITRITE UR QL STRIP.AUTO: NEGATIVE
OSMOLALITY SERPL CALC.SUM OF ELEC: 282 MOSM/KG (ref 275–295)
PH UR STRIP.AUTO: 5 [PH] (ref 5–8)
PLATELET # BLD AUTO: 302 10(3)UL (ref 150–450)
POTASSIUM SERPL-SCNC: 3.9 MMOL/L (ref 3.5–5.1)
PROT SERPL-MCNC: 7.4 G/DL (ref 6.4–8.2)
PROT UR STRIP.AUTO-MCNC: NEGATIVE MG/DL
RBC # BLD AUTO: 5.09 X10(6)UL
RBC UR QL AUTO: NEGATIVE
SODIUM SERPL-SCNC: 136 MMOL/L (ref 136–145)
SP GR UR STRIP.AUTO: 1.02 (ref 1–1.03)
UROBILINOGEN UR STRIP.AUTO-MCNC: <2 MG/DL
WBC # BLD AUTO: 12.6 X10(3) UL (ref 4–11)

## 2022-03-22 PROCEDURE — 80053 COMPREHEN METABOLIC PANEL: CPT | Performed by: EMERGENCY MEDICINE

## 2022-03-22 PROCEDURE — 96374 THER/PROPH/DIAG INJ IV PUSH: CPT

## 2022-03-22 PROCEDURE — 99284 EMERGENCY DEPT VISIT MOD MDM: CPT

## 2022-03-22 PROCEDURE — 96375 TX/PRO/DX INJ NEW DRUG ADDON: CPT

## 2022-03-22 PROCEDURE — 83690 ASSAY OF LIPASE: CPT | Performed by: EMERGENCY MEDICINE

## 2022-03-22 PROCEDURE — 74176 CT ABD & PELVIS W/O CONTRAST: CPT | Performed by: EMERGENCY MEDICINE

## 2022-03-22 PROCEDURE — 81025 URINE PREGNANCY TEST: CPT

## 2022-03-22 PROCEDURE — 81003 URINALYSIS AUTO W/O SCOPE: CPT | Performed by: EMERGENCY MEDICINE

## 2022-03-22 PROCEDURE — 96361 HYDRATE IV INFUSION ADD-ON: CPT

## 2022-03-22 PROCEDURE — 85025 COMPLETE CBC W/AUTO DIFF WBC: CPT | Performed by: EMERGENCY MEDICINE

## 2022-03-22 RX ORDER — HYDROMORPHONE HYDROCHLORIDE 1 MG/ML
0.5 INJECTION, SOLUTION INTRAMUSCULAR; INTRAVENOUS; SUBCUTANEOUS EVERY 30 MIN PRN
Status: DISCONTINUED | OUTPATIENT
Start: 2022-03-22 | End: 2022-03-22

## 2022-03-22 RX ORDER — KETOROLAC TROMETHAMINE 30 MG/ML
15 INJECTION, SOLUTION INTRAMUSCULAR; INTRAVENOUS ONCE
Status: COMPLETED | OUTPATIENT
Start: 2022-03-22 | End: 2022-03-22

## 2022-03-22 RX ORDER — SODIUM CHLORIDE 9 MG/ML
125 INJECTION, SOLUTION INTRAVENOUS CONTINUOUS
Status: DISCONTINUED | OUTPATIENT
Start: 2022-03-22 | End: 2022-03-22

## 2022-03-22 RX ORDER — METRONIDAZOLE 500 MG/1
500 TABLET ORAL 3 TIMES DAILY
Qty: 30 TABLET | Refills: 0 | Status: SHIPPED | OUTPATIENT
Start: 2022-03-22 | End: 2022-04-01

## 2022-03-22 RX ORDER — ONDANSETRON 2 MG/ML
4 INJECTION INTRAMUSCULAR; INTRAVENOUS ONCE
Status: COMPLETED | OUTPATIENT
Start: 2022-03-22 | End: 2022-03-22

## 2022-03-22 RX ORDER — CIPROFLOXACIN 500 MG/1
500 TABLET, FILM COATED ORAL 2 TIMES DAILY
Qty: 20 TABLET | Refills: 0 | Status: SHIPPED | OUTPATIENT
Start: 2022-03-22 | End: 2022-04-01

## 2022-03-22 NOTE — ED QUICK NOTES
Pt awake and alert, skin w/d,resps reg/unlabored. Pt states she is feeling better after pain medication. Pt aware she is to be discharged shortly.

## 2025-03-24 RX ORDER — MELOXICAM 15 MG/1
15 TABLET ORAL DAILY
COMMUNITY
Start: 2025-02-07 | End: 2025-03-26

## 2025-03-25 ENCOUNTER — TELEPHONE (OUTPATIENT)
Dept: FAMILY MEDICINE CLINIC | Facility: CLINIC | Age: 50
End: 2025-03-25

## 2025-03-25 NOTE — TELEPHONE ENCOUNTER
04/07/2025 L4-5 Extreme Lateral LIF w/ PSF & possible L5-S1 laminectomy with Dr. Mcfarland @ [facility]    H&P - Completed 03/26/2025 with Dr. Supa Ferrara   Labs - Creatinine (1.04), Bilirubin (2), MCV (78.5), MCH (25.5), RDW (16.1), MRSA neg, all other labs WNL  EKG - Sinus sudeep (48bpm), otherwise WNL  CXR - WNL

## 2025-03-26 ENCOUNTER — LAB ENCOUNTER (OUTPATIENT)
Dept: LAB | Facility: HOSPITAL | Age: 50
End: 2025-03-26
Attending: FAMILY MEDICINE
Payer: COMMERCIAL

## 2025-03-26 ENCOUNTER — OFFICE VISIT (OUTPATIENT)
Dept: FAMILY MEDICINE CLINIC | Facility: CLINIC | Age: 50
End: 2025-03-26
Payer: COMMERCIAL

## 2025-03-26 ENCOUNTER — HOSPITAL ENCOUNTER (OUTPATIENT)
Dept: GENERAL RADIOLOGY | Facility: HOSPITAL | Age: 50
Discharge: HOME | End: 2025-03-26
Attending: FAMILY MEDICINE
Payer: COMMERCIAL

## 2025-03-26 VITALS
HEIGHT: 66 IN | TEMPERATURE: 98 F | WEIGHT: 210 LBS | DIASTOLIC BLOOD PRESSURE: 78 MMHG | RESPIRATION RATE: 16 BRPM | OXYGEN SATURATION: 98 % | BODY MASS INDEX: 33.75 KG/M2 | SYSTOLIC BLOOD PRESSURE: 118 MMHG | HEART RATE: 58 BPM

## 2025-03-26 DIAGNOSIS — E78.2 MIXED HYPERLIPIDEMIA: ICD-10-CM

## 2025-03-26 DIAGNOSIS — Z01.812 PRE-OPERATIVE LABORATORY EXAMINATION: ICD-10-CM

## 2025-03-26 DIAGNOSIS — M48.062 SPINAL STENOSIS OF LUMBAR REGION WITH NEUROGENIC CLAUDICATION: Primary | ICD-10-CM

## 2025-03-26 DIAGNOSIS — Z01.818 PREOP EXAMINATION: ICD-10-CM

## 2025-03-26 DIAGNOSIS — K76.89 FOCAL NODULAR HYPERPLASIA OF LIVER: ICD-10-CM

## 2025-03-26 DIAGNOSIS — Z90.49 HISTORY OF CHOLECYSTECTOMY: ICD-10-CM

## 2025-03-26 DIAGNOSIS — Z86.69 HISTORY OF MIGRAINE: ICD-10-CM

## 2025-03-26 LAB
ALBUMIN SERPL-MCNC: 4.2 G/DL (ref 3.2–4.8)
ALBUMIN/GLOB SERPL: 1.6 {RATIO} (ref 1–2)
ALP LIVER SERPL-CCNC: 40 U/L
ALT SERPL-CCNC: 19 U/L
ANION GAP SERPL CALC-SCNC: 10 MMOL/L (ref 0–18)
ANTIBODY SCREEN: NEGATIVE
APTT PPP: 29.1 SECONDS (ref 23–36)
AST SERPL-CCNC: 23 U/L (ref ?–34)
ATRIAL RATE: 48 BPM
B-HCG UR QL: NEGATIVE
BASOPHILS # BLD AUTO: 0.04 X10(3) UL (ref 0–0.2)
BASOPHILS NFR BLD AUTO: 0.4 %
BILIRUB SERPL-MCNC: 2 MG/DL (ref 0.3–1.2)
BILIRUB UR QL: NEGATIVE
BUN BLD-MCNC: 15 MG/DL (ref 9–23)
BUN/CREAT SERPL: 14.4 (ref 10–20)
CALCIUM BLD-MCNC: 9.7 MG/DL (ref 8.7–10.4)
CHLORIDE SERPL-SCNC: 102 MMOL/L (ref 98–112)
CLARITY UR: CLEAR
CO2 SERPL-SCNC: 26 MMOL/L (ref 21–32)
CREAT BLD-MCNC: 1.04 MG/DL
DEPRECATED RDW RBC AUTO: 46.2 FL (ref 35.1–46.3)
EGFRCR SERPLBLD CKD-EPI 2021: 66 ML/MIN/1.73M2 (ref 60–?)
EOSINOPHIL # BLD AUTO: 0.11 X10(3) UL (ref 0–0.7)
EOSINOPHIL NFR BLD AUTO: 1.2 %
ERYTHROCYTE [DISTWIDTH] IN BLOOD BY AUTOMATED COUNT: 16.1 % (ref 11–15)
FASTING STATUS PATIENT QL REPORTED: YES
GLOBULIN PLAS-MCNC: 2.6 G/DL (ref 2–3.5)
GLUCOSE BLD-MCNC: 82 MG/DL (ref 70–99)
GLUCOSE UR-MCNC: NORMAL MG/DL
HCT VFR BLD AUTO: 37.3 %
HGB BLD-MCNC: 12.1 G/DL
HGB UR QL STRIP.AUTO: NEGATIVE
IMM GRANULOCYTES # BLD AUTO: 0.03 X10(3) UL (ref 0–1)
IMM GRANULOCYTES NFR BLD: 0.3 %
INR BLD: 0.97 (ref 0.8–1.2)
KETONES UR-MCNC: NEGATIVE MG/DL
LEUKOCYTE ESTERASE UR QL STRIP.AUTO: NEGATIVE
LYMPHOCYTES # BLD AUTO: 2.9 X10(3) UL (ref 1–4)
LYMPHOCYTES NFR BLD AUTO: 32 %
MCH RBC QN AUTO: 25.5 PG (ref 26–34)
MCHC RBC AUTO-ENTMCNC: 32.4 G/DL (ref 31–37)
MCV RBC AUTO: 78.5 FL
MONOCYTES # BLD AUTO: 0.49 X10(3) UL (ref 0.1–1)
MONOCYTES NFR BLD AUTO: 5.4 %
NEUTROPHILS # BLD AUTO: 5.5 X10 (3) UL (ref 1.5–7.7)
NEUTROPHILS # BLD AUTO: 5.5 X10(3) UL (ref 1.5–7.7)
NEUTROPHILS NFR BLD AUTO: 60.7 %
NITRITE UR QL STRIP.AUTO: NEGATIVE
OSMOLALITY SERPL CALC.SUM OF ELEC: 286 MOSM/KG (ref 275–295)
P AXIS: -7 DEGREES
P-R INTERVAL: 162 MS
PH UR: 5 [PH] (ref 5–8)
PLATELET # BLD AUTO: 338 10(3)UL (ref 150–450)
POTASSIUM SERPL-SCNC: 3.6 MMOL/L (ref 3.5–5.1)
PROT SERPL-MCNC: 6.8 G/DL (ref 5.7–8.2)
PROT UR-MCNC: NEGATIVE MG/DL
PROTHROMBIN TIME: 13.5 SECONDS (ref 11.6–14.8)
Q-T INTERVAL: 444 MS
QRS DURATION: 84 MS
QTC CALCULATION (BEZET): 396 MS
R AXIS: 4 DEGREES
RBC # BLD AUTO: 4.75 X10(6)UL
RH BLOOD TYPE: NEGATIVE
SODIUM SERPL-SCNC: 138 MMOL/L (ref 136–145)
SP GR UR STRIP: 1.02 (ref 1–1.03)
T AXIS: 10 DEGREES
UROBILINOGEN UR STRIP-ACNC: NORMAL
VENTRICULAR RATE: 48 BPM
WBC # BLD AUTO: 9.1 X10(3) UL (ref 4–11)

## 2025-03-26 PROCEDURE — 3074F SYST BP LT 130 MM HG: CPT | Performed by: FAMILY MEDICINE

## 2025-03-26 PROCEDURE — 85025 COMPLETE CBC W/AUTO DIFF WBC: CPT | Performed by: FAMILY MEDICINE

## 2025-03-26 PROCEDURE — 81025 URINE PREGNANCY TEST: CPT | Performed by: FAMILY MEDICINE

## 2025-03-26 PROCEDURE — 87081 CULTURE SCREEN ONLY: CPT | Performed by: FAMILY MEDICINE

## 2025-03-26 PROCEDURE — 80053 COMPREHEN METABOLIC PANEL: CPT | Performed by: FAMILY MEDICINE

## 2025-03-26 PROCEDURE — 71046 X-RAY EXAM CHEST 2 VIEWS: CPT | Performed by: FAMILY MEDICINE

## 2025-03-26 PROCEDURE — 99204 OFFICE O/P NEW MOD 45 MIN: CPT | Performed by: FAMILY MEDICINE

## 2025-03-26 PROCEDURE — 85610 PROTHROMBIN TIME: CPT | Performed by: FAMILY MEDICINE

## 2025-03-26 PROCEDURE — 86900 BLOOD TYPING SEROLOGIC ABO: CPT | Performed by: FAMILY MEDICINE

## 2025-03-26 PROCEDURE — 3008F BODY MASS INDEX DOCD: CPT | Performed by: FAMILY MEDICINE

## 2025-03-26 PROCEDURE — 3078F DIAST BP <80 MM HG: CPT | Performed by: FAMILY MEDICINE

## 2025-03-26 PROCEDURE — 85730 THROMBOPLASTIN TIME PARTIAL: CPT | Performed by: FAMILY MEDICINE

## 2025-03-26 PROCEDURE — 86901 BLOOD TYPING SEROLOGIC RH(D): CPT | Performed by: FAMILY MEDICINE

## 2025-03-26 PROCEDURE — 86850 RBC ANTIBODY SCREEN: CPT | Performed by: FAMILY MEDICINE

## 2025-03-26 PROCEDURE — 81003 URINALYSIS AUTO W/O SCOPE: CPT | Performed by: FAMILY MEDICINE

## 2025-03-26 RX ORDER — ACETAMINOPHEN 500 MG
1000 TABLET ORAL EVERY 6 HOURS PRN
COMMUNITY

## 2025-03-26 NOTE — TELEPHONE ENCOUNTER
Dr. Ferrara, please review:    Labs - Creatinine (1.04), Bilirubin (2), MCV (78.5), MCH (25.5), RDW (16.1), MRSA neg, all other labs WNL    EKG - Sinus sudeep (48bpm), otherwise WNL    CXR - WNL    Pt okay for surgery?

## 2025-03-26 NOTE — H&P
[facility] PRE-OP CLINIC [place]    PRE-OP NOTE    HPI:   I have been consulted by Dr. Mcfarland to see Eloise Quiñones 50 year old female for a preoperative evaluation and medical clearance. She has a long history of worsening severe degenerative lumbar disc disease with lumbar spinal stenosis . Patient is to have a L4-L5 extreme lateral lumbar interbody fusion with posterior spinal fusion and possible L5-S1 laminectomy by Dr. Mcfarland  on 2025.     Pt suffers significant pain and loss of function.     Patient has no cardiopulmonary symptoms.      Patient has no history of SRIKANTH or DVT. Denies tobacco use.       Family History   Problem Relation Age of Onset    Diabetes Father     Psychiatric Father         PTSD [place]    Other (Other) Father     Cancer Mother 52        multiple myeloma    Diabetes Mother     Heart Disorder Maternal Grandmother 68        CAD    Diabetes Paternal Grandmother       Current Outpatient Medications   Medication Sig Dispense Refill    acetaminophen 500 MG Oral Tab Take 2 tablets (1,000 mg total) by mouth every 6 (six) hours as needed for Pain.       Past Medical History:    Back problem    Hx laparoscopic cholecystectomy    HYPERLIPIDEMIA    Migraines    Visual impairment    glasses     Past Surgical History:   Procedure Laterality Date          x 2    Other surgical history  15    wisdom teeth    Removal gallbladder       Social History     Socioeconomic History    Marital status:      Spouse name: Not on file    Number of children: Not on file    Years of education: Not on file    Highest education level: Not on file   Occupational History    Not on file   Tobacco Use    Smoking status: Never     Passive exposure: Never    Smokeless tobacco: Never   Vaping Use    Vaping status: Never Used   Substance and Sexual Activity    Alcohol use: Yes     Comment: occ    Drug use: No    Sexual activity: Not on file   Other Topics Concern    Not on file   Social History Narrative    Not on  file     Social Drivers of Health     Food Insecurity: Not on file   Transportation Needs: Not on file   Stress: Not on file   Housing Stability: Not on file       REVIEW OF SYSTEMS:   CONSTITUTIONAL:  Denies unusual weight gain/loss, fever, chills  EENT:  Eyes:  Denies eye pain, visual loss, blurred vision, double vision. Ears, Nose, Throat:  Denies congestion, runny nose or sore throat.  INTEGUMENTARY:  Denies rashes, itching, skin lesion,   CARDIOVASCULAR:  Denies DVT. Denies chest pain, palpitations, edema, dyspnea  RESPIRATORY: Denies  SRIKANTH ,Denies shortness of breath, wheezing, cough  GASTROINTESTINAL:  Denies abdominal pain, nausea, vomiting, constipation, diarrhea, or blood in stool.  MUSCULOSKELETAL: severe pain to her low back as noted above  NEUROLOGICAL:  Denies headache, seizures, dizziness, syncope, paralysis, ataxia,  HEMATOLOGIC:  Denies anemia, bleeding or bruising.  LYMPHATICS:  Denies enlarged nodes   PSYCHIATRIC:  Denies depression or anxiety.  ENDOCRINOLOGIC: DM 2 NO,   ALLERGIES:  Denies allergic response, history of asthma, hives,     EXAM:   /78 (BP Location: Left arm)   Pulse 58   Temp 97.7 °F (36.5 °C) (Temporal)   Resp 16   Ht 5' 6\" (1.676 m)   Wt 210 lb (95.3 kg)   LMP 03/06/2025   SpO2 98%   BMI 33.89 kg/m²  Estimated body mass index is 33.89 kg/m² as calculated from the following:    Height as of this encounter: 5' 6\" (1.676 m).    Weight as of this encounter: 210 lb (95.3 kg).   Vital signs reviewed.Appears stated age, well groomed.  Physical Exam:  GEN:  Patient is alert, awake and oriented, well developed, well nourished, no apparent distress.  HEENT:  Head:  Normocephalic, atraumatic Nose: patent, no nasal discharge  NECK: Supple, no CLAD, no carotid bruit, no thyromegaly.  SKIN: No rashes, no skin lesion, no bruising, good turgor.  HEART:  Regular rate and rhythm, no murmurs, rubs or gallops.  LUNGS: Clear to auscultation bilterally, no rales/rhonchi/wheezing.  CHEST:  No tenderness.  ABDOMEN:  Soft, nondistended, nontender,  no masses, no hepatosplenomegaly.  BACK: tenderness to low back bilaterally,   EXTREMITIES:  No edema, no cyanosis, no clubbing,   NEURO:  No focal deficit, speech fluent, she walks with an antalgic  gait, strength and tone, sensory intact      Lab Results   Component Value Date    WBC 9.1 03/26/2025    HGB 12.1 03/26/2025    HCT 37.3 03/26/2025    .0 03/26/2025    CREATSERUM 1.04 (H) 03/26/2025    BUN 15 03/26/2025     03/26/2025    K 3.6 03/26/2025     03/26/2025    CO2 26.0 03/26/2025    GLU 82 03/26/2025    CA 9.7 03/26/2025    ALB 4.2 03/26/2025    ALKPHO 40 03/26/2025    BILT 2.0 (H) 03/26/2025    TP 6.8 03/26/2025    AST 23 03/26/2025    ALT 19 03/26/2025    PTT 29.1 03/26/2025    INR 0.97 03/26/2025    TSH 1.240 02/21/2014     03/22/2022       XR CHEST PA + LAT CHEST (CPT=71046)    Result Date: 3/26/2025  CONCLUSION: No acute cardiopulmonary disease.    Dictated by (CST): Nam Brar MD on 3/26/2025 at 12:39 PM     Finalized by (CST): Nam Brar MD on 3/26/2025 at 12:39 PM           EKG 12 Lead    Result Date: 3/26/2025  Sinus bradycardia Otherwise normal ECG No previous ECGs found in [place]     ASSESSMENT AND PLAN:   Eloise Quiñones is a 50 year old female, with a hx of severe degenerative disc disease and lumbar spinal stenosis  who presents for a pre-operative physical exam. Patient is to have a L4-L5 extreme lumbar interbody fusion and possible L5-S1 laminectomy  by Dr. Mcfarland  on 4/7/2025.      ICD-10-CM    1. Spinal stenosis of lumbar region with neurogenic claudication  M48.062       2. Focal nodular hyperplasia of liver  K76.89       3. History of cholecystectomy  Z90.49       4. History of migraine  Z86.69       5. Mixed hyperlipidemia  E78.2       6. BMI 33.0-33.9,adult  Z68.33       7. Preop examination  Z01.818 CBC With Differential With Platelet     Comp Metabolic Panel (14)     EKG 12 Lead     MSSA and  MRSA Culture Screen     XR CHEST PA + LAT CHEST (CPT=71046)     Urinalysis with Culture Reflex     Type and screen     Prothrombin Time (PT)     Pregnancy Test, Urine     PTT, Activated      8. Pre-operative laboratory examination  Z01.812 CBC With Differential With Platelet     Comp Metabolic Panel (14)     EKG 12 Lead     MSSA and MRSA Culture Screen     XR CHEST PA + LAT CHEST (CPT=71046)     Urinalysis with Culture Reflex     Type and screen     Prothrombin Time (PT)     Pregnancy Test, Urine     PTT, Activated        Patient Active Problem List   Diagnosis    Enthesopathy of hip region    Focal nodular hyperplasia of liver    History of cholecystectomy        ECG and labs are pending review     Preoperative Risk Stratification: There are no decompensated medical conditions. ASA classification 2    Medical history:  High risk surgery (vascular, thoracic, intra-peritoneal): No  CAD: No  CHF: No  Stroke: No  DM on insulin: No  Serum Creatinine >2 mg/dl: No  Patient has an RCRI score that is  low risk and is at low risk for major cardiac event in the perioperative period.     Patient is medically optimized and has an acceptable risk of surgery and may proceed with surgery as planned.     PLAN:    Patient to discontinue medications and supplements with anticoagulation properties as per instruction.        Postoperative Recommendations:    Anticoagulation / DVT prophylaxis: SCDs, early ambulation.   GI protection: Protonix  Incentive Spirometry   Telemetry as needed  CPAP/O2 as needed   DM: QID glucoscans and sliding scale insulin as needed   Renal protection: (hydration / NSAID and ACE/ARB avoidance)   Cognitive protection: (minimize narcotics, benzodiazepines, scopolamine)     Pain management and Physical therapy as per Orthopedic service.   Home Health as needed    Thank you for the opportunity to care for your patient and to assist in managing the postoperative course.        Supa Ferrara DO  3/26/2025  2:49  PM

## 2025-03-28 NOTE — TELEPHONE ENCOUNTER
MAGDI for patient letting her know test results and that she is clear for surgery per Dr. Ferrara. She can call back if she has any questions. Dr. Mcfarland's office notified.

## 2025-04-07 ENCOUNTER — HOSPITAL ENCOUNTER (INPATIENT)
Facility: HOSPITAL | Age: 50
LOS: 2 days | Discharge: HOME | End: 2025-04-09
Attending: ORTHOPAEDIC SURGERY | Admitting: ORTHOPAEDIC SURGERY
Payer: COMMERCIAL

## 2025-04-07 ENCOUNTER — ANESTHESIA EVENT (OUTPATIENT)
Dept: SURGERY | Facility: HOSPITAL | Age: 50
End: 2025-04-07
Payer: COMMERCIAL

## 2025-04-07 ENCOUNTER — APPOINTMENT (OUTPATIENT)
Dept: GENERAL RADIOLOGY | Facility: HOSPITAL | Age: 50
End: 2025-04-07
Attending: ORTHOPAEDIC SURGERY
Payer: COMMERCIAL

## 2025-04-07 ENCOUNTER — ANESTHESIA (OUTPATIENT)
Dept: SURGERY | Facility: HOSPITAL | Age: 50
End: 2025-04-07
Payer: COMMERCIAL

## 2025-04-07 PROBLEM — Z98.1 S/P LUMBAR FUSION: Status: ACTIVE | Noted: 2025-04-07

## 2025-04-07 PROBLEM — M48.062 SPINAL STENOSIS OF LUMBAR REGION WITH NEUROGENIC CLAUDICATION: Status: ACTIVE | Noted: 2025-04-07

## 2025-04-07 LAB
B-HCG UR QL: NEGATIVE
RH BLOOD TYPE: NEGATIVE

## 2025-04-07 PROCEDURE — 0SG0071 FUSION OF LUMBAR VERTEBRAL JOINT WITH AUTOLOGOUS TISSUE SUBSTITUTE, POSTERIOR APPROACH, POSTERIOR COLUMN, OPEN APPROACH: ICD-10-PCS | Performed by: ORTHOPAEDIC SURGERY

## 2025-04-07 PROCEDURE — 76000 FLUOROSCOPY <1 HR PHYS/QHP: CPT | Performed by: ORTHOPAEDIC SURGERY

## 2025-04-07 PROCEDURE — 4A11X4G MONITORING OF PERIPHERAL NERVOUS ELECTRICAL ACTIVITY, INTRAOPERATIVE, EXTERNAL APPROACH: ICD-10-PCS | Performed by: ORTHOPAEDIC SURGERY

## 2025-04-07 PROCEDURE — 0SG00A0 FUSION OF LUMBAR VERTEBRAL JOINT WITH INTERBODY FUSION DEVICE, ANTERIOR APPROACH, ANTERIOR COLUMN, OPEN APPROACH: ICD-10-PCS | Performed by: ORTHOPAEDIC SURGERY

## 2025-04-07 PROCEDURE — 0SB20ZZ EXCISION OF LUMBAR VERTEBRAL DISC, OPEN APPROACH: ICD-10-PCS | Performed by: ORTHOPAEDIC SURGERY

## 2025-04-07 PROCEDURE — 99222 1ST HOSP IP/OBS MODERATE 55: CPT | Performed by: HOSPITALIST

## 2025-04-07 DEVICE — SCREW SPNL 5.5MM LOK OPN TULIP RELINE: Type: IMPLANTABLE DEVICE | Site: BACK | Status: FUNCTIONAL

## 2025-04-07 DEVICE — SCREW SPNL 6.5X45MM 2C REDUC POLYAX RELINE: Type: IMPLANTABLE DEVICE | Site: BACK | Status: FUNCTIONAL

## 2025-04-07 DEVICE — COHERE XL, 10X18X50MM 10°
Type: IMPLANTABLE DEVICE | Site: BACK | Status: FUNCTIONAL
Brand: COHERE

## 2025-04-07 DEVICE — ROD SPNL 5.5X45MM POST: Type: IMPLANTABLE DEVICE | Site: BACK | Status: FUNCTIONAL

## 2025-04-07 DEVICE — K WIRE FIX NIT BVL BLNT TIP PRECEPT: Type: IMPLANTABLE DEVICE | Site: BACK

## 2025-04-07 DEVICE — GRAFT BNE SUB MTRX C OSTEOCEL PRO: Type: IMPLANTABLE DEVICE | Site: BACK | Status: FUNCTIONAL

## 2025-04-07 RX ORDER — OXYCODONE HYDROCHLORIDE 5 MG/1
5 TABLET ORAL EVERY 4 HOURS PRN
Status: DISCONTINUED | OUTPATIENT
Start: 2025-04-07 | End: 2025-04-09

## 2025-04-07 RX ORDER — FAMOTIDINE 20 MG/1
20 TABLET, FILM COATED ORAL ONCE
Status: COMPLETED | OUTPATIENT
Start: 2025-04-07 | End: 2025-04-07

## 2025-04-07 RX ORDER — DIPHENHYDRAMINE HCL 25 MG
25 CAPSULE ORAL EVERY 4 HOURS PRN
Status: DISCONTINUED | OUTPATIENT
Start: 2025-04-07 | End: 2025-04-09

## 2025-04-07 RX ORDER — NALOXONE HYDROCHLORIDE 0.4 MG/ML
0.08 INJECTION, SOLUTION INTRAMUSCULAR; INTRAVENOUS; SUBCUTANEOUS AS NEEDED
Status: DISCONTINUED | OUTPATIENT
Start: 2025-04-07 | End: 2025-04-07 | Stop reason: HOSPADM

## 2025-04-07 RX ORDER — ONDANSETRON 2 MG/ML
INJECTION INTRAMUSCULAR; INTRAVENOUS AS NEEDED
Status: DISCONTINUED | OUTPATIENT
Start: 2025-04-07 | End: 2025-04-07 | Stop reason: SURG

## 2025-04-07 RX ORDER — METHOCARBAMOL 100 MG/ML
1000 INJECTION, SOLUTION INTRAMUSCULAR; INTRAVENOUS ONCE
Status: COMPLETED | OUTPATIENT
Start: 2025-04-07 | End: 2025-04-07

## 2025-04-07 RX ORDER — PROCHLORPERAZINE EDISYLATE 5 MG/ML
5 INJECTION INTRAMUSCULAR; INTRAVENOUS EVERY 8 HOURS PRN
Status: DISCONTINUED | OUTPATIENT
Start: 2025-04-07 | End: 2025-04-07 | Stop reason: HOSPADM

## 2025-04-07 RX ORDER — HYDROMORPHONE HYDROCHLORIDE 1 MG/ML
0.4 INJECTION, SOLUTION INTRAMUSCULAR; INTRAVENOUS; SUBCUTANEOUS EVERY 5 MIN PRN
Status: DISCONTINUED | OUTPATIENT
Start: 2025-04-07 | End: 2025-04-07 | Stop reason: HOSPADM

## 2025-04-07 RX ORDER — ACETAMINOPHEN 500 MG
1000 TABLET ORAL ONCE
Status: COMPLETED | OUTPATIENT
Start: 2025-04-07 | End: 2025-04-07

## 2025-04-07 RX ORDER — ROCURONIUM BROMIDE 10 MG/ML
INJECTION, SOLUTION INTRAVENOUS AS NEEDED
Status: DISCONTINUED | OUTPATIENT
Start: 2025-04-07 | End: 2025-04-07 | Stop reason: SURG

## 2025-04-07 RX ORDER — NALOXONE HYDROCHLORIDE 0.4 MG/ML
80 INJECTION, SOLUTION INTRAMUSCULAR; INTRAVENOUS; SUBCUTANEOUS AS NEEDED
Status: DISCONTINUED | OUTPATIENT
Start: 2025-04-07 | End: 2025-04-07 | Stop reason: HOSPADM

## 2025-04-07 RX ORDER — METOCLOPRAMIDE 10 MG/1
10 TABLET ORAL ONCE
Status: COMPLETED | OUTPATIENT
Start: 2025-04-07 | End: 2025-04-07

## 2025-04-07 RX ORDER — MIDAZOLAM HYDROCHLORIDE 1 MG/ML
INJECTION INTRAMUSCULAR; INTRAVENOUS AS NEEDED
Status: DISCONTINUED | OUTPATIENT
Start: 2025-04-07 | End: 2025-04-07 | Stop reason: SURG

## 2025-04-07 RX ORDER — HYDROMORPHONE HYDROCHLORIDE 1 MG/ML
0.2 INJECTION, SOLUTION INTRAMUSCULAR; INTRAVENOUS; SUBCUTANEOUS EVERY 5 MIN PRN
Status: DISCONTINUED | OUTPATIENT
Start: 2025-04-07 | End: 2025-04-07 | Stop reason: HOSPADM

## 2025-04-07 RX ORDER — ONDANSETRON 2 MG/ML
4 INJECTION INTRAMUSCULAR; INTRAVENOUS EVERY 6 HOURS PRN
Status: DISCONTINUED | OUTPATIENT
Start: 2025-04-07 | End: 2025-04-09

## 2025-04-07 RX ORDER — DIAZEPAM 5 MG/1
5 TABLET ORAL EVERY 6 HOURS PRN
Status: DISCONTINUED | OUTPATIENT
Start: 2025-04-07 | End: 2025-04-09

## 2025-04-07 RX ORDER — DOCUSATE SODIUM 100 MG/1
100 CAPSULE, LIQUID FILLED ORAL 2 TIMES DAILY
Status: DISCONTINUED | OUTPATIENT
Start: 2025-04-07 | End: 2025-04-09

## 2025-04-07 RX ORDER — MORPHINE SULFATE 4 MG/ML
2 INJECTION, SOLUTION INTRAMUSCULAR; INTRAVENOUS EVERY 10 MIN PRN
Status: DISCONTINUED | OUTPATIENT
Start: 2025-04-07 | End: 2025-04-07 | Stop reason: HOSPADM

## 2025-04-07 RX ORDER — TIZANIDINE 2 MG/1
2 TABLET ORAL EVERY 8 HOURS PRN
Status: DISCONTINUED | OUTPATIENT
Start: 2025-04-07 | End: 2025-04-09

## 2025-04-07 RX ORDER — MORPHINE SULFATE 10 MG/ML
6 INJECTION, SOLUTION INTRAMUSCULAR; INTRAVENOUS EVERY 10 MIN PRN
Status: DISCONTINUED | OUTPATIENT
Start: 2025-04-07 | End: 2025-04-07 | Stop reason: HOSPADM

## 2025-04-07 RX ORDER — MORPHINE SULFATE 4 MG/ML
4 INJECTION, SOLUTION INTRAMUSCULAR; INTRAVENOUS EVERY 10 MIN PRN
Status: DISCONTINUED | OUTPATIENT
Start: 2025-04-07 | End: 2025-04-07 | Stop reason: HOSPADM

## 2025-04-07 RX ORDER — METHYLPREDNISOLONE ACETATE 40 MG/ML
INJECTION, SUSPENSION INTRA-ARTICULAR; INTRALESIONAL; INTRAMUSCULAR; SOFT TISSUE AS NEEDED
Status: DISCONTINUED | OUTPATIENT
Start: 2025-04-07 | End: 2025-04-07 | Stop reason: HOSPADM

## 2025-04-07 RX ORDER — LIDOCAINE HYDROCHLORIDE 10 MG/ML
INJECTION, SOLUTION EPIDURAL; INFILTRATION; INTRACAUDAL; PERINEURAL AS NEEDED
Status: DISCONTINUED | OUTPATIENT
Start: 2025-04-07 | End: 2025-04-07 | Stop reason: SURG

## 2025-04-07 RX ORDER — SODIUM PHOSPHATE, DIBASIC AND SODIUM PHOSPHATE, MONOBASIC 7; 19 G/230ML; G/230ML
1 ENEMA RECTAL ONCE AS NEEDED
Status: DISCONTINUED | OUTPATIENT
Start: 2025-04-07 | End: 2025-04-09

## 2025-04-07 RX ORDER — BUPIVACAINE HYDROCHLORIDE AND EPINEPHRINE 5; 5 MG/ML; UG/ML
INJECTION, SOLUTION PERINEURAL AS NEEDED
Status: DISCONTINUED | OUTPATIENT
Start: 2025-04-07 | End: 2025-04-07 | Stop reason: HOSPADM

## 2025-04-07 RX ORDER — HYDROMORPHONE HYDROCHLORIDE 1 MG/ML
0.6 INJECTION, SOLUTION INTRAMUSCULAR; INTRAVENOUS; SUBCUTANEOUS EVERY 5 MIN PRN
Status: DISCONTINUED | OUTPATIENT
Start: 2025-04-07 | End: 2025-04-07 | Stop reason: HOSPADM

## 2025-04-07 RX ORDER — ACETAMINOPHEN 10 MG/ML
1000 INJECTION, SOLUTION INTRAVENOUS ONCE
Status: COMPLETED | OUTPATIENT
Start: 2025-04-07 | End: 2025-04-07

## 2025-04-07 RX ORDER — SODIUM CHLORIDE 9 MG/ML
INJECTION, SOLUTION INTRAVENOUS CONTINUOUS PRN
Status: DISCONTINUED | OUTPATIENT
Start: 2025-04-07 | End: 2025-04-07 | Stop reason: SURG

## 2025-04-07 RX ORDER — HYDROMORPHONE HYDROCHLORIDE 1 MG/ML
0.8 INJECTION, SOLUTION INTRAMUSCULAR; INTRAVENOUS; SUBCUTANEOUS EVERY 2 HOUR PRN
Status: DISCONTINUED | OUTPATIENT
Start: 2025-04-07 | End: 2025-04-09

## 2025-04-07 RX ORDER — OXYCODONE HYDROCHLORIDE 5 MG/1
10 TABLET ORAL EVERY 4 HOURS PRN
Status: DISCONTINUED | OUTPATIENT
Start: 2025-04-07 | End: 2025-04-09

## 2025-04-07 RX ORDER — SODIUM CHLORIDE, SODIUM LACTATE, POTASSIUM CHLORIDE, CALCIUM CHLORIDE 600; 310; 30; 20 MG/100ML; MG/100ML; MG/100ML; MG/100ML
INJECTION, SOLUTION INTRAVENOUS CONTINUOUS
Status: DISCONTINUED | OUTPATIENT
Start: 2025-04-07 | End: 2025-04-07 | Stop reason: HOSPADM

## 2025-04-07 RX ORDER — METOCLOPRAMIDE HYDROCHLORIDE 5 MG/ML
10 INJECTION INTRAMUSCULAR; INTRAVENOUS ONCE
Status: COMPLETED | OUTPATIENT
Start: 2025-04-07 | End: 2025-04-07

## 2025-04-07 RX ORDER — ONDANSETRON 2 MG/ML
4 INJECTION INTRAMUSCULAR; INTRAVENOUS EVERY 6 HOURS PRN
Status: DISCONTINUED | OUTPATIENT
Start: 2025-04-07 | End: 2025-04-07 | Stop reason: HOSPADM

## 2025-04-07 RX ORDER — SODIUM CHLORIDE, SODIUM LACTATE, POTASSIUM CHLORIDE, CALCIUM CHLORIDE 600; 310; 30; 20 MG/100ML; MG/100ML; MG/100ML; MG/100ML
INJECTION, SOLUTION INTRAVENOUS CONTINUOUS
Status: DISCONTINUED | OUTPATIENT
Start: 2025-04-07 | End: 2025-04-09

## 2025-04-07 RX ORDER — HYDROMORPHONE HYDROCHLORIDE 1 MG/ML
0.4 INJECTION, SOLUTION INTRAMUSCULAR; INTRAVENOUS; SUBCUTANEOUS EVERY 2 HOUR PRN
Status: DISCONTINUED | OUTPATIENT
Start: 2025-04-07 | End: 2025-04-09

## 2025-04-07 RX ORDER — SENNOSIDES 8.6 MG
17.2 TABLET ORAL NIGHTLY
Status: DISCONTINUED | OUTPATIENT
Start: 2025-04-07 | End: 2025-04-09

## 2025-04-07 RX ORDER — FAMOTIDINE 10 MG/ML
20 INJECTION, SOLUTION INTRAVENOUS ONCE
Status: COMPLETED | OUTPATIENT
Start: 2025-04-07 | End: 2025-04-07

## 2025-04-07 RX ORDER — DEXAMETHASONE SODIUM PHOSPHATE 4 MG/ML
VIAL (ML) INJECTION AS NEEDED
Status: DISCONTINUED | OUTPATIENT
Start: 2025-04-07 | End: 2025-04-07 | Stop reason: SURG

## 2025-04-07 RX ORDER — DIPHENHYDRAMINE HYDROCHLORIDE 50 MG/ML
25 INJECTION, SOLUTION INTRAMUSCULAR; INTRAVENOUS EVERY 4 HOURS PRN
Status: DISCONTINUED | OUTPATIENT
Start: 2025-04-07 | End: 2025-04-09

## 2025-04-07 RX ORDER — BISACODYL 10 MG
10 SUPPOSITORY, RECTAL RECTAL
Status: DISCONTINUED | OUTPATIENT
Start: 2025-04-07 | End: 2025-04-09

## 2025-04-07 RX ORDER — POLYETHYLENE GLYCOL 3350 17 G/17G
17 POWDER, FOR SOLUTION ORAL DAILY PRN
Status: DISCONTINUED | OUTPATIENT
Start: 2025-04-07 | End: 2025-04-09

## 2025-04-07 RX ADMIN — DEXAMETHASONE SODIUM PHOSPHATE 8 MG: 4 MG/ML VIAL (ML) INJECTION at 12:10:00

## 2025-04-07 RX ADMIN — MIDAZOLAM HYDROCHLORIDE 2 MG: 1 INJECTION INTRAMUSCULAR; INTRAVENOUS at 12:04:00

## 2025-04-07 RX ADMIN — SODIUM CHLORIDE, SODIUM LACTATE, POTASSIUM CHLORIDE, CALCIUM CHLORIDE: 600; 310; 30; 20 INJECTION, SOLUTION INTRAVENOUS at 15:55:00

## 2025-04-07 RX ADMIN — LIDOCAINE HYDROCHLORIDE 50 MG: 10 INJECTION, SOLUTION EPIDURAL; INFILTRATION; INTRACAUDAL; PERINEURAL at 12:07:00

## 2025-04-07 RX ADMIN — SODIUM CHLORIDE: 9 INJECTION, SOLUTION INTRAVENOUS at 12:14:00

## 2025-04-07 RX ADMIN — ROCURONIUM BROMIDE 10 MG: 10 INJECTION, SOLUTION INTRAVENOUS at 12:07:00

## 2025-04-07 RX ADMIN — ONDANSETRON 4 MG: 2 INJECTION INTRAMUSCULAR; INTRAVENOUS at 15:46:00

## 2025-04-07 NOTE — PLAN OF CARE
Surgery Instructions          [facility]  [address]  [place] [address]  T: [phone]  F: [phone]    Surgery:   Coronary Angiogram with Possible Percutaneous Transluminal Coronary Angioplasty and Stenting.    Date of Surgery:  Friday May 3rd at 11 A.M. ARRIVED AT 9 A.M.     You will need a ride home. There is a possibility of an extended period of bedrest and/or a possibility of an overnight stay.    The Surgery Department at [facility] in [place] is located on the 1st floor between our two Main Entrances (North and South) on [address]. Please use the Main Hospital entrance.     Preoperative Instructions:  Any required lab work must be completed within 2-3 days of your procedure.     Medication Instructions:  Do not take the following medications the day before or the day of your procedure:  TELMISARTAN    Fasting Instructions:  Fasting - DO NOT eat or drink (including water and medications) after MIDNIGHT on the day prior to your procedure.      Important Information:  The Business Office/Managed Care Department will seek authorization from your insurance prior to your procedure (this does not include Workmen’s Compensation or  Involved Accidents). You would receive notification of any issues.    If your employer or family member requires completion of forms (ex. FMLA paperwork), bring these into the [facility]. First complete your portion and sign a release so we may fax these forms for you, this is a HIPAA requirement. They can take up to 7-10 business days to complete.  If you should have any questions, please call the Cardiology Clinic at [phone].     Patient is POD 0. A/Ox4. On RA. Tolerating diet. To check void. PT to see tomorrow. Dressings CDI. Gel ice. PRN oxy for pain. Call light and personal items within reach. Family at bedside. Discharge plans to follow.      Problem: PAIN - ADULT  Goal: Verbalizes/displays adequate comfort level or patient's stated pain goal  Description: INTERVENTIONS:- Encourage pt to monitor pain and request assistance- Assess pain using appropriate pain scale- Administer analgesics based on type and severity of pain and evaluate response- Implement non-pharmacological measures as appropriate and evaluate response- Consider cultural and social influences on pain and pain management- Manage/alleviate anxiety- Utilize distraction and/or relaxation techniques- Monitor for opioid side effects- Notify MD/LIP if interventions unsuccessful or patient reports new pain- Anticipate increased pain with activity and pre-medicate as appropriate  Outcome: Progressing     Problem: DISCHARGE PLANNING  Goal: Discharge to home or other facility with appropriate resources  Description: INTERVENTIONS:- Identify barriers to discharge w/pt and caregiver- Include patient/family/discharge partner in discharge planning- Arrange for needed discharge resources and transportation as appropriate- Identify discharge learning needs (meds, wound care, etc)- Arrange for interpreters to assist at discharge as needed- Consider post-discharge preferences of patient/family/discharge partner- Complete POLST form as appropriate- Assess patient's ability to be responsible for managing their own health- Refer to Case Management Department for coordinating discharge planning if the patient needs post-hospital services based on physician/LIP order or complex needs related to functional status, cognitive ability or social support system  Outcome: Progressing     Problem: METABOLIC/FLUID AND ELECTROLYTES - ADULT  Goal: Electrolytes maintained within normal limits  Description:  INTERVENTIONS:- Monitor labs and rhythm and assess patient for signs and symptoms of electrolyte imbalances- Administer electrolyte replacement as ordered- Monitor response to electrolyte replacements, including rhythm and repeat lab results as appropriate- Fluid restriction as ordered- Instruct patient on fluid and nutrition restrictions as appropriate  Outcome: Progressing     Problem: SKIN/TISSUE INTEGRITY - ADULT  Goal: Skin integrity remains intact  Description: INTERVENTIONS- Assess and document risk factors for pressure ulcer development- Assess and document skin integrity- Monitor for areas of redness and/or skin breakdown- Initiate interventions, skin care algorithm/standards of care as needed  Outcome: Progressing  Goal: Incision(s), wounds(s) or drain site(s) healing without S/S of infection  Description: INTERVENTIONS:- Assess and document risk factors for pressure ulcer development- Assess and document skin integrity- Assess and document dressing/incision, wound bed, drain sites and surrounding tissue- Implement wound care per orders- Initiate isolation precautions as appropriate- Initiate Pressure Ulcer prevention bundle as indicated  Outcome: Progressing     Problem: HEMATOLOGIC - ADULT  Goal: Maintains hematologic stability  Description: INTERVENTIONS- Assess for signs and symptoms of bleeding or hemorrhage- Monitor labs and vital signs for trends- Administer supportive blood products/factors, fluids and medications as ordered and appropriate- Administer supportive blood products/factors as ordered and appropriate  Outcome: Progressing     Problem: Impaired Functional Mobility  Goal: Achieve highest/safest level of mobility/gait  Description: Interventions:- Assess patient's functional ability and stability- Promote increasing activity/tolerance for mobility and gait- Educate and engage patient/family in tolerated activity level and precautions- Recommend use of chair position in bed 3 times per  day  Outcome: Progressing

## 2025-04-07 NOTE — H&P
[facility]  part of [facility]    History & Physical    Eloise Quiñones Patient Status:  Inpatient    1975 MRN [id number]   Location [facility] PRE OP RECOVERY Attending Jose G Mcfarland MD   Hosp Day # 0 PCP Carlos Madrigal MD     Date:  2025  Date of Admission:  2025    History:  Past Medical History:    Back problem    Hx laparoscopic cholecystectomy    HYPERLIPIDEMIA    Migraines    Visual impairment    glasses     Past Surgical History:   Procedure Laterality Date          x 2    Other surgical history  15    wisdom teeth    Removal gallbladder       Family History   Problem Relation Age of Onset    Diabetes Father     Psychiatric Father         PTSD [place]    Other (Other) Father     Cancer Mother 52        multiple myeloma    Diabetes Mother     Heart Disorder Maternal Grandmother 68        CAD    Diabetes Paternal Grandmother       reports that she has never smoked. She has never been exposed to tobacco smoke. She has never used smokeless tobacco. She reports current alcohol use. She reports that she does not use drugs.    Allergies:  Allergies[1]    Home Medications:  Prescriptions Prior to Admission[2]    Review of Systems:  12 point ROS reviewed without pertinent positives.     HPI: The patient presents with complaints of low back pain with radiculopathy. The pain radiates from the low back to the right buttock, lateral hip and thigh to the lateral calf, anterior shin.      They deny prior lumbar spine surgery.  They deny current fevers, chills, infection, rashes/bruises on the body, gross bowel/bladder incontinence or saddle anesthesia.     Physical Exam:  The patient is well developed, no acute distress, alert and oriented x3, skin intact to the posterior lumbar without erythema or edema, motor exam with 4/5 EHL/tib ant on the right, otherwise 5/5 bilateral lower extremities, calves soft and non tender, 2+DP      Assessment:  Patient Active Problem List    Diagnosis    Enthesopathy of hip region    Focal nodular hyperplasia of liver    History of cholecystectomy     Lumbar spondylolisthesis  Lumbar stenosis and right lumbar radiculopathy    Plan:  L4-5 XLIF with PSF, possible right L5-S1 hemilaminotomy       Humaira Agee PA-C  4/7/2025  10:20 AM        [1]   Allergies  Allergen Reactions    Biaxin [Clarithromycin] DIARRHEA and NAUSEA AND VOMITING     Upset stomach    Clarithromycin DIARRHEA and NAUSEA AND VOMITING     Upset stomach    Metronidazole NAUSEA ONLY     Upset stomach    Tramadol ITCHING   [2]   Medications Prior to Admission   Medication Sig Dispense Refill Last Dose/Taking    acetaminophen 500 MG Oral Tab Take 2 tablets (1,000 mg total) by mouth every 6 (six) hours as needed for Pain.   4/6/2025 at  2:00 PM

## 2025-04-07 NOTE — CONSULTS
[facility]    PATIENT'S NAME: PHAN MAYA   ATTENDING PHYSICIAN: Jose G Mcfarland MD   CONSULTING PHYSICIAN: Krystal Escobedo MD   PATIENT ACCOUNT#:   [id number]    LOCATION:  [facility] PACU 5 [facility] 10  MEDICAL RECORD #:   [id number]       YOB: 1975  ADMISSION DATE:       04/07/2025      CONSULT DATE:  04/07/2025    REPORT OF CONSULTATION      REASON FOR CONSULTATION:  Post lumbar interbody fusion.    HISTORY OF PRESENT ILLNESS:  The patient is a 50-year-old  female with chronic back pain, neurogenic claudication, extruded disc with moderate spinal stenosis plus 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  Scheduled today for above-mentioned procedure by her orthopedic surgeon, Dr. Mcfarland.  Postoperatively, transferred to PACU for further monitoring.    PAST MEDICAL HISTORY:  Degenerative joint disease of lumbar spine, hyperlipidemia, migraines.    PAST SURGICAL HISTORY:  Laparoscopic cholecystectomy.    MEDICATIONS:  Please see medication reconciliation list.     ALLERGIES:  No known drug allergies.  Multiple medication side effects.    SOCIAL HISTORY:  No tobacco or drug use.  Social alcohol.  Independent for basic activities of daily living.     FAMILY HISTORY:  Positive for diabetes mellitus type 2.  Mother had multiple myeloma.    REVIEW OF SYSTEMS:  Currently resting in bed, back discomfort.  No lower extremity tingling or numbness.  Other 12-point review of systems is negative.       PHYSICAL EXAMINATION:    GENERAL:  Alert and oriented to time, place, and person.  No acute distress.  VITAL SIGNS:  Temperature 98.0, pulse 70, respiratory rate 11, blood pressure 104/74, pulse ox 100% on room air.    HEENT:  Atraumatic.  Oropharynx clear.  Moist mucous membranes.  Ears, nose normal.  Eyes:  Anicteric sclerae.   NECK:  Supple.  No lymphadenopathy.  Trachea midline.  Full range of motion.  LUNGS:  Clear to auscultation bilaterally.   Normal respiratory effort.   HEART:  Regular rate, rhythm.  S1 and S2 auscultated.  No murmur.   ABDOMEN:  Soft, nondistended.  No tenderness.  Positive bowel sounds.   EXTREMITIES:  No peripheral edema, clubbing, or cyanosis.  BACK:  Lumbar area dressing.  NEUROLOGIC:  Motor and sensory intact.     ASSESSMENT AND PLAN:  Lumbar spinal stenosis with neurogenic claudication, status post L4-L5 extreme lateral lumbar interbody fusion with posterior spinal fusion and L4-L5 laminectomy, excision of synovial cyst at L4-L5.  Pain control.  Neuro checks.  DVT prophylaxis.  Physical and occupational therapy.    Dictated By Krystal Escobedo MD  d: 04/07/2025 17:33:52  t: 04/07/2025 17:37:17  Job [id number]  FB/    cc: Jose G Mcfarland MD

## 2025-04-07 NOTE — PROGRESS NOTES
Spine Surgery Progress Note     Interval history     Patient resting in PACU , pain well controlled.       Objective         Vitals:    04/07/25 1032   BP: 130/68   Pulse: 56   Resp: 18   Temp: 98.8 °F (37.1 °C)       Examination         IP Q TA EHL GS  R    5 5 5 5 5    L    4+** 4-** 5 5 5    ** - pain limited     Sensation intact to light touch L2-S1 dermatomes bilateral        Assessment  POD 0 from XLIF L4-5 with posterior hardware and R L4-5 hemilaminectomy facet cyst excision     Plan   -DVT ppx with SCD, stockings, and ambulation  -continue PT/OT eval and treatment  -postoperative radiograph  -regular diet   -pain control  -progress toward discharge     Jovany Gonzalez MD

## 2025-04-07 NOTE — ANESTHESIA PROCEDURE NOTES
Airway  Date/Time: 4/7/2025 12:09 PM  Urgency: Elective    Airway not difficult    General Information and Staff    Patient location during procedure: OR  Resident/CRNA: Shelly Fisher CRNA  Performed: CRNA   Performed by: Shelly Fisher CRNA  Authorized by: Arik Mckenzie MD      Indications and Patient Condition  Indications for airway management: anesthesia  Spontaneous Ventilation: absent  Sedation level: deep  Preoxygenated: yes  Patient position: sniffing  Mask difficulty assessment: 1 - vent by mask    Final Airway Details  Final airway type: endotracheal airway      Successful airway: ETT  Cuffed: yes   Successful intubation technique: direct laryngoscopy  Endotracheal tube insertion site: oral  Blade: [place]ba  ETT size (mm): 7.0    Cormack-Lehane Classification: grade IIA - partial view of glottis  Placement verified by: capnometry   Measured from: lips  Number of attempts at approach: 1

## 2025-04-07 NOTE — ANESTHESIA PREPROCEDURE EVALUATION
Anesthesia PreOp Note    HPI:     Eloise Quiñones is a 50 year old female who presents for preoperative consultation requested by: Jose G Mcfarland MD    Date of Surgery: 2025    Procedure(s):  L4 - 5 extreme lateral lumbar interbody fusion with posterior spinal fusion, possible L5 - S1 laminectomy  LUMBAR LAMINECTOMY 1 LEVEL  Indication: Lumbar spondylolisthesis, lumbar radiculopathy, spinal stenosis    Relevant Problems   No relevant active problems       NPO:  Last Liquid Consumption Date: 25  Last Liquid Consumption Time:   Last Solid Consumption Date: 25  Last Solid Consumption Time:   Last Liquid Consumption Date: 25          History Review:  Patient Active Problem List    Diagnosis Date Noted    History of cholecystectomy 10/09/2017    Focal nodular hyperplasia of liver 2017    Enthesopathy of hip region 2011       Past Medical History:    Back problem    Hx laparoscopic cholecystectomy    HYPERLIPIDEMIA    Migraines    Visual impairment    glasses       Past Surgical History:   Procedure Laterality Date          x 2    Other surgical history  14    wisdom teeth    Removal gallbladder         Prescriptions Prior to Admission[1]  Current Medications and Prescriptions Ordered in Epic[2]    Allergies[3]    Family History   Problem Relation Age of Onset    Diabetes Father     Psychiatric Father         PTSD [place]    Other (Other) Father     Cancer Mother 52        multiple myeloma    Diabetes Mother     Heart Disorder Maternal Grandmother 68        CAD    Diabetes Paternal Grandmother      Social History     Socioeconomic History    Marital status:    Tobacco Use    Smoking status: Never     Passive exposure: Never    Smokeless tobacco: Never   Vaping Use    Vaping status: Never Used   Substance and Sexual Activity    Alcohol use: Yes     Comment: occ    Drug use: No       Available pre-op labs reviewed.  Lab Results   Component Value Date    WBC 9.1  03/26/2025    RBC 4.75 03/26/2025    HGB 12.1 03/26/2025    HCT 37.3 03/26/2025    MCV 78.5 (L) 03/26/2025    MCH 25.5 (L) 03/26/2025    MCHC 32.4 03/26/2025    RDW 16.1 (H) 03/26/2025    .0 03/26/2025    PREGU Negative 03/26/2025    URINEPREG Negative 04/07/2025     Lab Results   Component Value Date     03/26/2025    K 3.6 03/26/2025     03/26/2025    CO2 26.0 03/26/2025    BUN 15 03/26/2025    CREATSERUM 1.04 (H) 03/26/2025    GLU 82 03/26/2025    CA 9.7 03/26/2025     Lab Results   Component Value Date    INR 0.97 03/26/2025       Vital Signs:  Body mass index is 33.89 kg/m².   height is 1.676 m (5' 6\") and weight is 95.3 kg (210 lb). Her oral temperature is 98.8 °F (37.1 °C). Her blood pressure is 130/68 and her pulse is 56. Her respiration is 18 and oxygen saturation is 100%.   Vitals:    03/24/25 1755 04/07/25 1032   BP:  130/68   Pulse:  56   Resp:  18   Temp:  98.8 °F (37.1 °C)   TempSrc:  Oral   SpO2:  100%   Weight: 95.3 kg (210 lb) 95.3 kg (210 lb)   Height: 1.676 m (5' 6\") 1.676 m (5' 6\")        Anesthesia Evaluation     Patient summary reviewed and Nursing notes reviewed    Airway   Mallampati: II  Dental      Pulmonary - negative ROS   Cardiovascular   Exercise tolerance: good    NYHA Classification: I    Neuro/Psych - negative ROS     GI/Hepatic/Renal - negative ROS     Endo/Other - negative ROS   Abdominal                  Anesthesia Plan:   ASA:  2  Plan:   General  Airway:  ETT  Post-op Pain Management: IV analgesics      I have informed Brittontrini Crista Quiñones and/or legal guardian or family member of the nature of the anesthetic plan, benefits, risks including possible dental damage if relevant, major complications, and any alternative forms of anesthetic management.   All of the patient's questions were answered to the best of my ability. The patient desires the anesthetic management as planned.  SYLVIE LY MD  4/7/2025 11:29 AM  Present on Admission:  **None**           [1]    Medications Prior to Admission   Medication Sig Dispense Refill Last Dose/Taking    acetaminophen 500 MG Oral Tab Take 2 tablets (1,000 mg total) by mouth every 6 (six) hours as needed for Pain.   4/6/2025 at  2:00 PM   [2]   Current Facility-Administered Medications Ordered in Epic   Medication Dose Route Frequency Provider Last Rate Last Admin    lactated ringers infusion   Intravenous Continuous Jose G Mcfarland MD 20 mL/hr at 04/07/25 1033 New Bag at 04/07/25 1033    ceFAZolin (Ancef) 2g in 10mL IV syringe premix  2 g Intravenous Once Jose G Mcfarland MD         No current [facility]-ordered outpatient medications on file.   [3]   Allergies  Allergen Reactions    Biaxin [Clarithromycin] DIARRHEA and NAUSEA AND VOMITING     Upset stomach    Clarithromycin DIARRHEA and NAUSEA AND VOMITING     Upset stomach    Metronidazole NAUSEA ONLY     Upset stomach    Tramadol ITCHING

## 2025-04-07 NOTE — BRIEF OP NOTE
Pre-Operative Diagnosis: Lumbar spondylolisthesis, lumbar radiculopathy, spinal stenosis     Post-Operative Diagnosis: Lumbar spondylolisthesis, lumbar radiculopathy, spinal stenosis      Procedure Performed:   L4 - 5 extreme lateral lumbar interbody fusion with posterior spinal fusion, L4-5 laminectomy, excision of cyst L4-5.    Surgeons and Role:     * Jose G Mcfarland MD - Primary     * Jovany Gonzalez MD - Assisting Surgeon    Assistant(s):  PA: Humaira Agee, PA-C     Surgical Findings: XLIF L4-5 PLF L4-5 R PLD L4-5     Specimen: none     Estimated Blood Loss: Blood Output: 50 mL (4/7/2025  3:17 PM)      Jovany Gonzalez MD  4/7/2025  4:12 PM

## 2025-04-07 NOTE — ANESTHESIA POSTPROCEDURE EVALUATION
Patient: Eloise Quiñones    Procedure Summary       Date: 04/07/25 Room / Location: [facility] MAIN OR  / [facility] MAIN OR    Anesthesia Start: 1201 Anesthesia Stop: 1636    Procedures:       L4 - 5 extreme lateral lumbar interbody fusion with posterior spinal fusion, L4-5 laminectomy, excision of cyst L4-5. (Spine Lumbar)      LUMBAR LAMINECTOMY 1 LEVEL (Spine Lumbar) Diagnosis: (Lumbar spondylolisthesis, lumbar radiculopathy, spinal stenosis)    Surgeons: Jose G Mcfarland MD Anesthesiologist: Arik Mckenzie MD    Anesthesia Type: general ASA Status: 2            Anesthesia Type: general    Vitals Value Taken Time   /78 04/07/25 1632   Temp 97.1 04/07/25 1636   Pulse 72 04/07/25 1635   Resp 22 04/07/25 1635   SpO2 97 % 04/07/25 1635   Vitals shown include unfiled device data.    [facility] AN Post Evaluation:   Patient Evaluated in PACU  Patient Participation: complete - patient participated  Level of Consciousness: awake  Pain Score: 0  Pain Management: adequate  Airway Patency:patent  Dental exam unchanged from preop  Yes    Nausea/Vomiting: none  Cardiovascular Status: acceptable  Respiratory Status: acceptable  Postoperative Hydration acceptable      Shelly Fisher CRNA  4/7/2025 4:36 PM   Spoke with pt-last seen 1/24 for post op [employer]ist Laparoscopic Total Hysterectomy; Bilateral Salpingectomy; Cystoscopy 1/8.   Pt was seen in the ED 2/4 for viral gastroenteritis.   CT scan from visit showed:    Left posterior pelvic ovoid 5 x 6 cm cystic lesion as well as a right  pelvic/adnexal round 1.9 cm cystic lesion most likely represent  ovarian cysts. Pelvic ultrasound could be considered for further  characterization as clinically indicated.    Pt reports intermittent mild pelvic pain.   Per pt, hx of cyst rupture 12/2023.   Pt has apt scheduled 2/21, pt inquiring if she should have US completed prior to scheduled visit?  Please advise.

## 2025-04-08 ENCOUNTER — APPOINTMENT (OUTPATIENT)
Dept: GENERAL RADIOLOGY | Facility: HOSPITAL | Age: 50
End: 2025-04-08
Attending: PHYSICIAN ASSISTANT
Payer: COMMERCIAL

## 2025-04-08 LAB
DEPRECATED RDW RBC AUTO: 45.4 FL (ref 35.1–46.3)
ERYTHROCYTE [DISTWIDTH] IN BLOOD BY AUTOMATED COUNT: 15.9 % (ref 11–15)
HCT VFR BLD AUTO: 35.4 %
HGB BLD-MCNC: 11.1 G/DL
MCH RBC QN AUTO: 24.7 PG (ref 26–34)
MCHC RBC AUTO-ENTMCNC: 31.4 G/DL (ref 31–37)
MCV RBC AUTO: 78.8 FL
PLATELET # BLD AUTO: 304 10(3)UL (ref 150–450)
RBC # BLD AUTO: 4.49 X10(6)UL
WBC # BLD AUTO: 16.8 X10(3) UL (ref 4–11)

## 2025-04-08 PROCEDURE — 72100 X-RAY EXAM L-S SPINE 2/3 VWS: CPT | Performed by: PHYSICIAN ASSISTANT

## 2025-04-08 PROCEDURE — 99233 SBSQ HOSP IP/OBS HIGH 50: CPT | Performed by: INTERNAL MEDICINE

## 2025-04-08 RX ORDER — DEXAMETHASONE SODIUM PHOSPHATE 4 MG/ML
10 VIAL (ML) INJECTION EVERY 8 HOURS
Status: COMPLETED | OUTPATIENT
Start: 2025-04-08 | End: 2025-04-09

## 2025-04-08 RX ORDER — CALCIUM CARBONATE 500 MG/1
1000 TABLET, CHEWABLE ORAL 3 TIMES DAILY PRN
Status: DISCONTINUED | OUTPATIENT
Start: 2025-04-08 | End: 2025-04-09

## 2025-04-08 NOTE — PAYOR COMM NOTE
--------------  ADMISSION REVIEW     Payor: YOKASTA QUEZADA POS/COMFORTNP  Subscriber #:  [id number]  Authorization Number: [id number]    Admit date: 4/7/25  Admit time:  9:51 AM       REVIEW DOCUMENTATION:  4/7  Procedure Performed:   L4 - 5 extreme lateral lumbar interbody fusion with posterior spinal fusion, L4-5 laminectomy, excision of cyst L4-5.      Surgical Findings: XLIF L4-5 PLF L4-5 R PLD L4-5     Specimen: none     Estimated Blood Loss: Blood Output: 50 mL (4/7/2025  3:17 PM)      MEDICATIONS ADMINISTERED IN LAST 1 DAY:  acetaminophen (Ofirmev) 10 mg/mL infusion premix 1,000 mg       Date Action Dose Route User    4/7/2025 1634 New Bag 1,000 mg Intravenous Brunner, Denise, RN          acetaminophen (Tylenol Extra Strength) tab 1,000 mg       Date Action Dose Route User    4/7/2025 1033 Given 1,000 mg Oral Doreen Banegas RN          bupivacaine 0.5%-EPINEPHrine 1:200,000 (Marcaine-EPINEPHrine) injection       Date Action Dose Route User    4/7/2025 1554 Given 20 mL Subcutaneous (Back) Jovany Gonzalez MD    4/7/2025 1255 Given 10 mL Subcutaneous (Other) Jovany Gonzalez MD          ceFAZolin (Ancef) 2g in 10mL IV syringe premix       Date Action Dose Route User    4/7/2025 1212 Given 2 g Intravenous Shelly Fisher CRNA          ceFAZolin (Ancef) 2g in 10mL IV syringe premix       Date Action Dose Route User    4/8/2025 0428 New Bag 2 g Intravenous Sanjuanita Hardy RN    4/7/2025 2021 New Bag 2 g Intravenous Sanjuanita Hardy RN          dexamethasone (Decadron) 4 MG/ML injection       Date Action Dose Route User    4/7/2025 1210 Given 8 mg Intravenous Shelly Fisher CRNA          docusate sodium (Colace) cap 100 mg       Date Action Dose Route User    4/8/2025 0837 Given 100 mg Oral Isis Peng RN    4/7/2025 2021 Given 100 mg Oral Sanjuanita Hardy RN          famotidine (Pepcid) tab 20 mg       Date Action Dose Route User    4/7/2025 1033 Given 20 mg Oral Doreen Banegas, RN          fentaNYL (Sublimaze) 50 mcg/mL  injection       Date Action Dose Route User    4/7/2025 1434 Given 50 mcg Intravenous Shelly Fisher CRNA    4/7/2025 1416 Given 75 mcg Intravenous Shelly Fisher CRNA    4/7/2025 1329 Given 25 mcg Intravenous Shelly Fisher CRNA    4/7/2025 1221 Given 100 mcg Intravenous Shelly Fisher CRNA    4/7/2025 1207 Given 100 mcg Intravenous Shelly Fisher CRNA          HYDROmorphone (Dilaudid) 1 MG/ML injection 0.4 mg       Date Action Dose Route User    4/7/2025 1721 Given 0.4 mg Intravenous Brunner, Denise, RN    4/7/2025 1641 Given 0.4 mg Intravenous Brunner, Denise, RN          HYDROmorphone (Dilaudid) 1 MG/ML injection 0.6 mg       Date Action Dose Route User    4/7/2025 1651 Given 0.6 mg Intravenous Brunner, Denise, RN    4/7/2025 1633 Given 0.6 mg Intravenous Brunner, Denise, RN          lactated ringers infusion       Date Action Dose Route User    4/7/2025 1555 Restarted (none) Intravenous Shelly Fisher CRNA    4/7/2025 1201 Continued by Anesthesia (none) Intravenous Shelly Fisher CRNA    4/7/2025 1033 New Bag (none) Intravenous Doreen Banegas RN          lactated ringers infusion       Date Action Dose Route User    4/7/2025 1622 Restarted (none) Intravenous Brunner, Denise, RN          lidocaine PF (Xylocaine-MPF) 1% injection       Date Action Dose Route User    4/7/2025 1207 Given 50 mg Intravenous Shelly Fisher CRNA          methocarbamol (Robaxin) 1000 MG/10ML injection 1,000 mg       Date Action Dose Route User    4/7/2025 1654 Given 1,000 mg Intravenous Brunner, Denise, RN          methylPREDNISolone acetate (DEPO-medrol) 40 MG/ML injection       Date Action Dose Route User    4/7/2025 1535 Given 40 mg Topical Jovany Gonzalez MD          metoclopramide (Reglan) tab 10 mg       Date Action Dose Route User    4/7/2025 1033 Given 10 mg Oral Doreen Banegas RN          midazolam (Versed) 2 MG/2ML injection       Date Action Dose Route User    4/7/2025 1204 Given 2 mg Intravenous Shelly Fisher,  CRNA          ondansetron (Zofran) 4 MG/2ML injection       Date Action Dose Route User    4/7/2025 1546 Given 4 mg Intravenous Shelly Fisher CRNA          oxyCODONE immediate release tab 10 mg       Date Action Dose Route User    4/8/2025 0837 Given 10 mg Oral Isis Peng RN    4/8/2025 0428 Given 10 mg Oral Sanjuanita Hardy RN    4/7/2025 2359 Given 10 mg Oral Sanjuanita Hardy RN    4/7/2025 1932 Given 10 mg Oral Isis Peng RN          propofol (Diprivan) 10 MG/ML injection       Date Action Dose Route User    4/7/2025 1412 Given 50 mg Intravenous Shelly Fisher CRNA    4/7/2025 1207 Given 200 mg Intravenous Shelly Fisher CRNA          propofol (Diprivan) 10 mg/mL infusion premix       Date Action Dose Route User    4/7/2025 1426 Rate/Dose Change 120 mcg/kg/min × 95.3 kg Intravenous Shelly Fisher CRNA    4/7/2025 1422 Rate/Dose Change 160 mcg/kg/min × 95.3 kg Intravenous Shelly Fisher CRNA    4/7/2025 1412 Rate/Dose Change 150 mcg/kg/min × 95.3 kg Intravenous Shelly Fisher CRNA    4/7/2025 1254 Rate/Dose Change 140 mcg/kg/min × 95.3 kg Intravenous Shelly Fisher CRNA    4/7/2025 1232 Rate/Dose Change 120 mcg/kg/min × 95.3 kg Intravenous Shelly Fisher CRNA    4/7/2025 1213 New Bag 150 mcg/kg/min × 95.3 kg Intravenous Shelly Fisher CRNA          rocuronium (Zemuron) 50 mg/5mL injection       Date Action Dose Route User    4/7/2025 1207 Given 10 mg Intravenous Shelly Fisher CRNA          sennosides (Senokot) tab 17.2 mg       Date Action Dose Route User    4/7/2025 2021 Given 17.2 mg Oral Sanjuanita Hardy RN          sodium chloride 0.9% infusion       Date Action Dose Route User    4/7/2025 1214 New Bag (none) Intravenous Shelly Fisher CRNA          succinylcholine (Anectine) 20 MG/ML injection       Date Action Dose Route User    4/7/2025 1207 Given 100 mg Intravenous Shelly Fisher CRNA          thrombin (Thrombin-JMI) 5000 units topical solution       Date Action Dose Route User    4/7/2025 3852  Given 20,000 Units Topical (Back) Jovany Gonzalez MD          tiZANidine (Zanaflex) tab 2 mg       Date Action Dose Route User    4/8/2025 0524 Given 2 mg Oral Sanjuanita Hardy RN    4/7/2025 2107 Given 2 mg Oral Sanjuanita Hardy, RN            Vitals (last day)       Date/Time Temp Pulse Resp BP SpO2 Weight O2 Device O2 Flow Rate (L/min) [facility]    04/08/25 0709 98.6 °F (37 °C) 59 16 101/52 95 % -- None (Room air) --     04/08/25 0427 97.8 °F (36.6 °C) 55 18 105/55 95 % -- None (Room air) -- LN    04/07/25 2357 98.5 °F (36.9 °C) 66 -- 109/60 97 % -- -- --     04/07/25 2017 98.3 °F (36.8 °C) 66 16 126/64 98 % -- -- --     04/07/25 1757 -- 61 16 135/76 98 % -- None (Room air) -- AA    04/07/25 1742 98.5 °F (36.9 °C) 57 15 121/75 97 % -- Nasal cannula 3 L/min     04/07/25 1732 -- 60 15 121/79 97 % -- Nasal cannula 3 L/min     04/07/25 1722 -- 70 11 104/74 100 % -- Nasal cannula 3 L/min     04/07/25 1712 -- 63 11 121/72 98 % -- Nasal cannula 3 L/min     04/07/25 1702 -- 52 16 135/83 100 % -- Nasal cannula 3 L/min DB    04/07/25 1652 -- 79 19 147/58 97 % -- Nasal cannula 3 L/min DB    04/07/25 1642 -- 68 14 137/83 100 % -- Nasal cannula 3 L/min DB    04/07/25 1632 -- 64 23 132/78 99 % -- Nasal cannula 3 L/min     04/07/25 1622 98 °F (36.7 °C) 73 27 132/91 95 % -- Nasal cannula 3 L/min     04/07/25 1032 98.8 °F (37.1 °C) 56 18 130/68 100 % 210 lb (95.3 kg) None (Room air) -- CP          CIWA Scores (since admission)       None

## 2025-04-08 NOTE — PROGRESS NOTES
[facility]  part of [facility]     Hospitalist Progress Note     Eloise Quiñones Patient Status:  Inpatient    1975 MRN [id number]   Location [facility] 4W/SW/SE Attending Steven Garcia MD   Hosp Day # 1 PCP Carlos Madrigal MD     Subjective:     Patient sitting up in a chair and in NAD. Denied any active complaints apart from mild pain and left sided numbness.   No overnight events reported by the nursing staff.       Objective:    Review of Systems:   ROS completed; pertinent positive and negatives stated in subjective.      Vital signs:  Temp:  [97.8 °F (36.6 °C)-98.8 °F (37.1 °C)] 98.6 °F (37 °C)  Pulse:  [52-79] 59  Resp:  [11-27] 16  BP: (101-147)/(52-91) 101/52  SpO2:  [95 %-100 %] 95 %      Physical Exam:    Gen: NAD AO x3  Chest: good air entry CTABL  CVS: normal s1 and s2 RR  Abd: NABS soft NT ND  Neuro: CN 2-12 grossly intact  Ext: no edema in bilateral LE      Diagnostic Data:    Labs:  Recent Labs   Lab 25  0439   WBC 16.8*   HGB 11.1*   MCV 78.8*   .0       No results for input(s): \"GLU\", \"BUN\", \"CREATSERUM\", \"GFRAA\", \"GFRNAA\", \"CA\", \"ALB\", \"NA\", \"K\", \"CL\", \"CO2\", \"ALKPHO\", \"AST\", \"ALT\", \"BILT\", \"TP\" in the last 168 hours.    CrCl cannot be calculated (Patient's most recent lab result is older than the maximum 7 days allowed.).    No results for input(s): \"PTP\", \"INR\" in the last 168 hours.           Imaging: Imaging data reviewed in Epic.    Medications:    sennosides  17.2 mg Oral Nightly    docusate sodium  100 mg Oral BID       Assessment & Plan:     Lumbar spinal stenosis s/p L4/5 laminectomy  Tolerated surgery well  IVF, abx, pain meds and DVT ppx per surgery  PRN pain control  PT/OT  Discharge planning      Plan of care discussed with patient or family at bedside.      Supplementary Documentation:     Quality:  DVT Prophylaxis: SCDs  CODE status: Full       Estimated date of discharge: TBD  Discharge is dependent on: clinical stability  At this  point Ms. Quiñones is expected to be discharge to: home                   Steven Garcia MD  Hospitalist    MDM: High, I personally reviewed the available laboratories, imaging. I discussed the case with RN. I ordered laboratories, studies including AM labs.  Medical decision making high, risk is high.       The 21st Century Cures Act makes medical notes like these available to patients in the interest of transparency. Please be advised this is a medical document. Medical documents are intended to carry relevant information, facts as evident, and the clinical opinion of the practitioner. The medical note is intended as peer to peer communication and may appear blunt or direct. It is written in medical language and may contain abbreviations or verbiage that are unfamiliar.

## 2025-04-08 NOTE — PHYSICAL THERAPY NOTE
PHYSICAL THERAPY EVALUATION - INPATIENT     Room Number: 413/413-A  Evaluation Date: 4/8/2025  Type of Evaluation: Initial   Physician Order: PT Eval and Treat    Presenting Problem: lumbar fusion  Co-Morbidities : Back problem    Hx laparoscopic cholecystectomy   HYPERLIPIDEMIA   Migraines   Visual impairment    glasses  Reason for Therapy: Mobility Dysfunction and Discharge Planning    PHYSICAL THERAPY ASSESSMENT   Patient is a 50 year old female admitted 4/7/2025 for s/p lumbar fusion.  Prior to admission, patient's baseline is independent.  Patient is currently functioning below baseline with bed mobility, transfers, gait, stair negotiation, maintaining seated position, standing prolonged periods, and performing household tasks.  Patient is requiring minimal assist as a result of the following impairments: decreased functional strength, decreased endurance/aerobic capacity, pain, impaired standing balance, decreased muscular endurance, and medical status.  Physical Therapy will continue to follow for duration of hospitalization.    Patient will benefit from continued skilled PT Services at discharge to promote prior level of function and safety with additional support and return home with home health PT (versus IR pending progress and improvement in LLE weakness post operatively)    PLAN DURING HOSPITALIZATION  Nursing Mobility Recommendation : 1 Assist  PT Treatment Plan: Bed mobility, Body mechanics, Coordination, Endurance, Energy conservation, Gait training, Strengthening, Stair training, Transfer training, Balance training  Rehab Potential : Good  Frequency (Obs): Daily     PHYSICAL THERAPY MEDICAL/SOCIAL HISTORY   History related to current admission:  The patient presents with complaints of low back pain with radiculopathy. The pain radiates from the low back to the right buttock, lateral hip and thigh to the lateral calf, anterior shin.      They deny prior lumbar spine surgery.  They deny current  fevers, chills, infection, rashes/bruises on the body, gross bowel/bladder incontinence or saddle anesthesia.      Problem List  Active Problems:    Spinal stenosis of lumbar region with neurogenic claudication    S/P lumbar fusion    HOME SITUATION  Type of Home: House  Home Layout: Able to live on main level, Two level  Stairs to Enter : 2   Lives With: Spouse, Son    Drives: Yes   Patient Regularly Uses: None     Prior Level of [place]: Prior to admission patient was independent with performance of all ADL's and performance of functional mobility with no assistive device.    SUBJECTIVE  \"This leg has been weak since surgery\"    PHYSICAL THERAPY EXAMINATION   OBJECTIVE  Precautions: Bed/chair alarm, Spine  Fall Risk: High fall risk    PAIN ASSESSMENT  Ratin  Location: spine, LLE  Management Techniques: Activity promotion, Body mechanics, Breathing techniques, Relaxation, Repositioning    COGNITION  Overall Cognitive Status:  WFL - within functional limits  Arousal/Alertness:  appropriate responses to stimuli  Orientation Level:  oriented x4  Safety Judgement:  good awareness of safety precautions    RANGE OF MOTION AND STRENGTH ASSESSMENT  Upper extremity ROM and strength are within functional limits BUE.  Lower extremity ROM is within functional limits BLE.  Lower extremity strength is within functional limits Right Hip flexion  3+/5  Left Hip flexion  1/5  Right Knee extension  4/5  Left Knee extension  2-/5  Right Knee flexion  4/5  Left Knee flexion  2-/5  Right Dorsiflexion  4/5  Left Dorsiflexion  2-/5    BALANCE  Static Sitting: Good  Dynamic Sitting: Fair  Static Standing: Fair  Dynamic Standing: Poor    ACTIVITY TOLERANCE  Pulse: 63  Heart Rate Source: Monitor  BP: 111/70  BP Location: Right arm  BP Method: Automatic  Patient Position: Sitting    O2 WALK  Oxygen Therapy  SPO2% on Room Air at Rest: 99    AM-PAC '6-Clicks' INPATIENT SHORT FORM - BASIC MOBILITY  How much difficulty does the patient  currently have...  Patient Difficulty: Turning over in bed (including adjusting bedclothes, sheets and blankets)?: A Little   Patient Difficulty: Sitting down on and standing up from a chair with arms (e.g., wheelchair, bedside commode, etc.): A Little   Patient Difficulty: Moving from lying on back to sitting on the side of the bed?: A Little   How much help from another person does the patient currently need...   Help from Another: Moving to and from a bed to a chair (including a wheelchair)?: A Little   Help from Another: Need to walk in hospital room?: A Lot   Help from Another: Climbing 3-5 steps with a railing?: A Lot     AM-PAC Score:  Raw Score: 16   Approx Degree of Impairment: 54.16%   Standardized Score (AM-PAC Scale): 40.78   CMS Modifier (G-Code): CK    FUNCTIONAL ABILITY STATUS  Functional Mobility/Gait Assessment  Gait Assistance: Minimum assistance  Distance (ft): 3 feet to bedside recliner  Assistive Device: Rolling walker  Pattern:  (decreased rustam, decreased step length, forward flexed posture, narrow base of support, decreased Wb'ing through LLE, verbal cues for sequencing and rolling walker management.)  Rolling: contact guard assist  Supine to Sit: minimal assist  Sit to Supine:  not tested  Sit to Stand: minimal assist    Exercise/Education Provided:  Education Provided To: Patient Patient Education: Role of Physical Therapy, Plan of Care, Discharge Recommendations, DME Recommendations, Functional Transfer Techniques, Fall Prevention, Surgical Precautions, Posture/Positioning, Energy Conservation, Proper Body Mechanics, Gait Training Patient's Response to Education: Verbalized Understanding, Requires Further Education     Skilled Therapy Provided: Patient received supine in bed at initiation of session agreeable to participation in PT. Patient with LLE impaired sensation and weakness post operative. Patient requires minimal assistance for bed mobility and bed to chair transfer. Patient left  in bedside recliner, lines intact, needs in reach and handoff to RN.    The patient's Approx Degree of Impairment: 54.16% has been calculated based on documentation in the [facility] '6 clicks' Inpatient Basic Mobility Short Form.  Research supports that patients with this level of impairment may benefit from home, pending progress.  Final disposition will be made by interdisciplinary medical team.    Patient End of Session: Up in chair, Needs met, Call light within reach, RN aware of session/findings, All patient questions and concerns addressed, Hospital anti-slip socks, Alarm set    CURRENT GOALS  Goals to be met by: 4/22/25  Patient Goal Patient's self-stated goal is: to return home   Goal #1 Patient is able to demonstrate supine - sit EOB @ level: modified independent     Goal #1   Current Status    Goal #2 Patient is able to demonstrate transfers EOB to/from Chair/Wheelchair at assistance level: modified independent with  least restrictive device     Goal #2  Current Status    Goal #3 Patient is able to ambulate >50 feet with assist device:  least restrictive device  at assistance level: modified independent   Goal #3   Current Status    Goal #4 Patient will negotiate 2 stairs/one curb w/ assistive device and supervision   Goal #4   Current Status    Goal #5 Patient to demonstrate independence with home activity/exercise instructions provided to patient in preparation for discharge.   Goal #5   Current Status    Goal #6    Goal #6  Current Status      Patient Evaluation Complexity Level:  History High - 3 or more personal factors and/or co-morbidities   Examination of body systems Moderate - addressing a total of 3 or more elements   Clinical Presentation  Moderate - Evolving   Clinical Decision Making  Moderate Complexity     Therapeutic Activity:  15 minutes    Lanie Mcallister, PT, DPT

## 2025-04-08 NOTE — PLAN OF CARE
Patient is A/Ox4. On RA. Tolerating diet. Voiding freely. Up 1x stand/pivot. PRN oxy for pain. PRN zanaflex and valium for muscle spasms. Call light and personal items within reach. Plan for discharge when medically cleared.      Problem: PAIN - ADULT  Goal: Verbalizes/displays adequate comfort level or patient's stated pain goal  Description: INTERVENTIONS:- Encourage pt to monitor pain and request assistance- Assess pain using appropriate pain scale- Administer analgesics based on type and severity of pain and evaluate response- Implement non-pharmacological measures as appropriate and evaluate response- Consider cultural and social influences on pain and pain management- Manage/alleviate anxiety- Utilize distraction and/or relaxation techniques- Monitor for opioid side effects- Notify MD/LIP if interventions unsuccessful or patient reports new pain- Anticipate increased pain with activity and pre-medicate as appropriate  Outcome: Progressing     Problem: DISCHARGE PLANNING  Goal: Discharge to home or other facility with appropriate resources  Description: INTERVENTIONS:- Identify barriers to discharge w/pt and caregiver- Include patient/family/discharge partner in discharge planning- Arrange for needed discharge resources and transportation as appropriate- Identify discharge learning needs (meds, wound care, etc)- Arrange for interpreters to assist at discharge as needed- Consider post-discharge preferences of patient/family/discharge partner- Complete POLST form as appropriate- Assess patient's ability to be responsible for managing their own health- Refer to Case Management Department for coordinating discharge planning if the patient needs post-hospital services based on physician/LIP order or complex needs related to functional status, cognitive ability or social support system  Outcome: Progressing     Problem: METABOLIC/FLUID AND ELECTROLYTES - ADULT  Goal: Electrolytes maintained within normal  limits  Description: INTERVENTIONS:- Monitor labs and rhythm and assess patient for signs and symptoms of electrolyte imbalances- Administer electrolyte replacement as ordered- Monitor response to electrolyte replacements, including rhythm and repeat lab results as appropriate- Fluid restriction as ordered- Instruct patient on fluid and nutrition restrictions as appropriate  Outcome: Progressing     Problem: SKIN/TISSUE INTEGRITY - ADULT  Goal: Skin integrity remains intact  Description: INTERVENTIONS- Assess and document risk factors for pressure ulcer development- Assess and document skin integrity- Monitor for areas of redness and/or skin breakdown- Initiate interventions, skin care algorithm/standards of care as needed  Outcome: Progressing  Goal: Incision(s), wounds(s) or drain site(s) healing without S/S of infection  Description: INTERVENTIONS:- Assess and document risk factors for pressure ulcer development- Assess and document skin integrity- Assess and document dressing/incision, wound bed, drain sites and surrounding tissue- Implement wound care per orders- Initiate isolation precautions as appropriate- Initiate Pressure Ulcer prevention bundle as indicated  Outcome: Progressing     Problem: HEMATOLOGIC - ADULT  Goal: Maintains hematologic stability  Description: INTERVENTIONS- Assess for signs and symptoms of bleeding or hemorrhage- Monitor labs and vital signs for trends- Administer supportive blood products/factors, fluids and medications as ordered and appropriate- Administer supportive blood products/factors as ordered and appropriate  Outcome: Progressing     Problem: Impaired Functional Mobility  Goal: Achieve highest/safest level of mobility/gait  Description: Interventions:- Assess patient's functional ability and stability- Promote increasing activity/tolerance for mobility and gait- Educate and engage patient/family in tolerated activity level and precautions- Recommend use of  RW for transfers  and ambulation  Outcome: Progressing

## 2025-04-08 NOTE — PROGRESS NOTES
[facility]    Department of Orthopedic Surgery  Spine Service Progress Note      ID   Eloise Quiñones is a 50 year old female   Procedure: L4 - 5 extreme lateral lumbar interbody fusion with posterior spinal fusion, L4-5 laminectomy, excision of cyst L4-5.   Surgeon: Jose G Mcfarland MD  POD#: 1 Day Post-Op  LOS#: 1 Days      Subjective  No acute events overnight. Reports some right radicular leg pain (over the anterior thigh) and incisional low back pain. Overall tolerable.  Denies headache, fevers/ chills, nausea/ vomiting, chest pain or shortness of breath.    Objective    Physical Exam  General: A&O x 3, No acute distress  Respiratory: Breathing unlabored  Musculoskeletal/ Neurologic:  Dressing clean, dry, intact  All extremities warm and well-perfused      BLE Exam:  MOTOR: (out of 5)   IP (L1-2) Q(L2-4) TA (L4) EHL (L5) GS (S1)    Left 4+ 4- 5 5 5    Right 5 5 5 5 5    Motor exam limited by pain but some improvement from prior exam yesterday    Some diminished sensation over the left anterior thigh  Normal patellar reflex  No abnormal beats of clonus  Negative Babinski    Most Recent Surgical Labs  Lab Results   Component Value Date    HGB 11.1 (L) 04/08/2025    WBC 16.8 (H) 04/08/2025    INR 0.97 03/26/2025    K 3.6 03/26/2025       Imaging  Post-op imaging of the spine: Pending    Assessment  50 year old female s/p L4 - 5 extreme lateral lumbar interbody fusion with posterior spinal fusion, L4-5 laminectomy, excision of cyst L4-5 on 4/7/2025       Plan:   -Active orthopaedic spine issues: Post-op recovery as expected  -Activity: WBAT, no bending/twisting/lifting, PT/OT   -Imaging: Standing prior to DC   -DVT PPX: SCDs and early ambulation  -Post-op antibiotics: Ancef for 24 hours post op  -Diet: Regular Diet    -Analgesia: PO as tolerated      -Disposition:   Medical stabilization  PT/OT clearance  Pain control    Abiel Escobar M.D.  Spine Surgery Fellow  Orthopaedic Surgery

## 2025-04-08 NOTE — PLAN OF CARE
Post-op day 1. Alert and oriented x4 on room air. Numbness to L thigh improving but pt still feeling weak, getting up x1 stand and pivot to RC. PRN oxy and tizanidine given for pain. Gel ice applied. SCDs for DVT prophylaxis. Voiding. Call light within reach.    Problem: Patient Centered Care  Goal: Patient preferences are identified and integrated in the patient's plan of care  Description: Interventions:- What would you like us to know as we care for you? - Provide timely, complete, and accurate information to patient/family- Incorporate patient and family knowledge, values, beliefs, and cultural backgrounds into the planning and delivery of care- Encourage patient/family to participate in care and decision-making at the level they choose- Honor patient and family perspectives and choices  Outcome: Progressing     Problem: PAIN - ADULT  Goal: Verbalizes/displays adequate comfort level or patient's stated pain goal  Description: INTERVENTIONS:- Encourage pt to monitor pain and request assistance- Assess pain using appropriate pain scale- Administer analgesics based on type and severity of pain and evaluate response- Implement non-pharmacological measures as appropriate and evaluate response- Consider cultural and social influences on pain and pain management- Manage/alleviate anxiety- Utilize distraction and/or relaxation techniques- Monitor for opioid side effects- Notify MD/LIP if interventions unsuccessful or patient reports new pain- Anticipate increased pain with activity and pre-medicate as appropriate  Outcome: Progressing     Problem: DISCHARGE PLANNING  Goal: Discharge to home or other facility with appropriate resources  Description: INTERVENTIONS:- Identify barriers to discharge w/pt and caregiver- Include patient/family/discharge partner in discharge planning- Arrange for needed discharge resources and transportation as appropriate- Identify discharge learning needs (meds, wound care, etc)- Arrange for  interpreters to assist at discharge as needed- Consider post-discharge preferences of patient/family/discharge partner- Complete POLST form as appropriate- Assess patient's ability to be responsible for managing their own health- Refer to Case Management Department for coordinating discharge planning if the patient needs post-hospital services based on physician/LIP order or complex needs related to functional status, cognitive ability or social support system  Outcome: Progressing     Problem: METABOLIC/FLUID AND ELECTROLYTES - ADULT  Goal: Electrolytes maintained within normal limits  Description: INTERVENTIONS:- Monitor labs and rhythm and assess patient for signs and symptoms of electrolyte imbalances- Administer electrolyte replacement as ordered- Monitor response to electrolyte replacements, including rhythm and repeat lab results as appropriate- Fluid restriction as ordered- Instruct patient on fluid and nutrition restrictions as appropriate  Outcome: Progressing     Problem: SKIN/TISSUE INTEGRITY - ADULT  Goal: Skin integrity remains intact  Description: INTERVENTIONS- Assess and document risk factors for pressure ulcer development- Assess and document skin integrity- Monitor for areas of redness and/or skin breakdown- Initiate interventions, skin care algorithm/standards of care as needed  Outcome: Progressing  Goal: Incision(s), wounds(s) or drain site(s) healing without S/S of infection  Description: INTERVENTIONS:- Assess and document risk factors for pressure ulcer development- Assess and document skin integrity- Assess and document dressing/incision, wound bed, drain sites and surrounding tissue- Implement wound care per orders- Initiate isolation precautions as appropriate- Initiate Pressure Ulcer prevention bundle as indicated  Outcome: Progressing     Problem: HEMATOLOGIC - ADULT  Goal: Maintains hematologic stability  Description: INTERVENTIONS- Assess for signs and symptoms of bleeding or  hemorrhage- Monitor labs and vital signs for trends- Administer supportive blood products/factors, fluids and medications as ordered and appropriate- Administer supportive blood products/factors as ordered and appropriate  Outcome: Progressing     Problem: Impaired Functional Mobility  Goal: Achieve highest/safest level of mobility/gait  Description: Interventions:- Assess patient's functional ability and stability- Promote increasing activity/tolerance for mobility and gait- Educate and engage patient/family in tolerated activity level and precautions  Outcome: Progressing

## 2025-04-08 NOTE — OCCUPATIONAL THERAPY NOTE
OCCUPATIONAL THERAPY EVALUATION - INPATIENT     Room Number: 413/413-A  Evaluation Date: 4/8/2025  Type of Evaluation: Initial       Physician Order: IP Consult to Occupational Therapy  Reason for Therapy: ADL/IADL Dysfunction and Discharge Planning    OCCUPATIONAL THERAPY ASSESSMENT   RN cleared pt for participation in OT session, which was completed in collaboration with PT. Upon arrival, pt was supine in bed and agreeable to activity. No visitors present during session. Pt was left in chair. Call light and all needs left in reach. Handoff given to RN.    Patient is a 50 year old female admitted 4/7/2025 for XLIF L4-5 with posterior hardware and R L4-5 hemilaminectomy facet cyst excision.  Prior to admission, pt was independent.  Patient is currently requiring up to max A for ADLs overall along with min A for supine to sit, CGA for sitting at EOB, min A for STS, and min A for functional transfer at SPT level towards R side. Pt tolerated about <1 minutes of supported standing.  Pt has the following impairments: LLE weakness and numbness, spinal precautions, pain.    ACTIVITY TOLERANCE  Pulse: 63        BP: 117/70           Oxygen Therapy  SPO2% on Room Air at Rest: 99    Education provided  Educated pt about role of OT and hospital therapy process as well as proper safety techniques including proper hand placement, body mechanics, safety techniques, spinal precautions, log rolling. Pt/family verbalized/demonstrated fair carryover.    Patient will benefit from continued skilled OT Services to facilitate return to prior level of function as patient demonstrates high motivation with excellent tolerance to an intensive therapy program    Hopeful, pt can progress to home but she cannot ambulate at this time due to LLE numbness/weakness.    PLAN  OT Treatment Plan: Balance activities;Energy conservation/work simplification techniques;Continued evaluation;Compensatory technique education;Fine motor coordination  activities;Neuromuscluar reeducation;Equipment eval/education;Patient/Family training;Patient/Family education;Cognitive reorientation;Endurance training;UE strengthening/ROM;Functional transfer training;Visual perceptual training;IADL training;ADL training      OCCUPATIONAL THERAPY MEDICAL/SOCIAL HISTORY     Problem List  Active Problems:    Spinal stenosis of lumbar region with neurogenic claudication    S/P lumbar fusion      Past Medical History  Past Medical History:    Back problem    Hx laparoscopic cholecystectomy    HYPERLIPIDEMIA    Migraines    Visual impairment    glasses       Past Surgical History  Past Surgical History:   Procedure Laterality Date          x 2    Other surgical history  15    wisdom teeth    Removal gallbladder         HOME SITUATION  Type of Home: House  Home Layout: Able to live on main level, Two level  Lives With: Spouse, Son                      Drives: Yes  Patient Regularly Uses: None    SUBJECTIVE  \"I cannot hike my hip.\"    OCCUPATIONAL THERAPY EXAMINATION      OBJECTIVE  Precautions: Spine  Fall Risk: High fall risk    PAIN ASSESSMENT  Ratin  Location: back  Management Techniques: Ice         COORDINATION  Gross Motor: WFL   Fine Motor: WFL     ACTIVITIES OF DAILY LIVING ASSESSMENT  AM-PAC ‘6-Clicks’ Inpatient Daily Activity Short Form  How much help from another person does the patient currently need…  -   Putting on and taking off regular lower body clothing?: A Lot  -   Bathing (including washing, rinsing, drying)?: A Lot  -   Toileting, which includes using toilet, bedpan or urinal? : A Lot  -   Putting on and taking off regular upper body clothing?: A Little  -   Taking care of personal grooming such as brushing teeth?: A Little  -   Eating meals?: None    AM-PAC Score:  Score: 16  Approx Degree of Impairment: 53.32%  Standardized Score (AM-PAC Scale): 35.96  CMS Modifier (G-Code): CK       FUNCTIONAL TRANSFER ASSESSMENT  Supine to Sit : Minimal Assist      Toilet Transfer: min A  Chair Transfer: min A    FUNCTIONAL ADL ASSESSMENT  UB ADLs: setup  LB ADLs: max A  Toileting: max A    The patient's Approx Degree of Impairment: 53.32% has been calculated based on documentation in the [facility] '6 clicks' Inpatient Daily Activity Short Form.  Research supports that patients with this level of impairment may benefit from IR. Final disposition will be made by interdisciplinary medical team.    OT Goals  Patients self stated goal is: to go home     Patient will complete functional transfer with mod I  Comment:     Patient will complete toileting with mod I  Comment:     Patient will tolerate standing for 3 minutes in prep for adls with mod I   Comment:    Patient will complete item retrieval with mod I  Comment:          Goals  on:   Frequency: 3-5x/wk    Patient Evaluation Complexity Level:   Occupational Profile/Medical History MODERATE - Expanded review of history including review of medical or therapy record   Specific performance deficits impacting engagement in ADL/IADL MODERATE  3 - 5 performance deficits   Client Assessment/Performance Deficits MODERATE - Comorbidities and min to mod modifications of tasks    Clinical Decision Making MODERATE - Analysis of occupational profile, detailed assessments, several treatment options    Overall Complexity MODERATE     OT Session Time: 20 minutes  Self-Care Home Management: 10 minutes         Faviola Narayan OT  [facility]  Inpatient Rehabilitation  Occupational Therapy  [phone]

## 2025-04-09 VITALS
HEIGHT: 66 IN | WEIGHT: 210 LBS | RESPIRATION RATE: 18 BRPM | OXYGEN SATURATION: 98 % | TEMPERATURE: 98 F | HEART RATE: 82 BPM | BODY MASS INDEX: 33.75 KG/M2 | SYSTOLIC BLOOD PRESSURE: 132 MMHG | DIASTOLIC BLOOD PRESSURE: 74 MMHG

## 2025-04-09 LAB
ALBUMIN SERPL-MCNC: 3.8 G/DL (ref 3.2–4.8)
ALBUMIN/GLOB SERPL: 1.5 {RATIO} (ref 1–2)
ALP LIVER SERPL-CCNC: 36 U/L (ref 39–100)
ALT SERPL-CCNC: 15 U/L (ref 10–49)
ANION GAP SERPL CALC-SCNC: 8 MMOL/L (ref 0–18)
AST SERPL-CCNC: 36 U/L (ref ?–34)
BASOPHILS # BLD AUTO: 0.01 X10(3) UL (ref 0–0.2)
BASOPHILS NFR BLD AUTO: 0.1 %
BILIRUB SERPL-MCNC: 0.9 MG/DL (ref 0.3–1.2)
BUN BLD-MCNC: 13 MG/DL (ref 9–23)
BUN/CREAT SERPL: 13.7 (ref 10–20)
CALCIUM BLD-MCNC: 8.7 MG/DL (ref 8.7–10.4)
CHLORIDE SERPL-SCNC: 106 MMOL/L (ref 98–112)
CO2 SERPL-SCNC: 25 MMOL/L (ref 21–32)
CREAT BLD-MCNC: 0.95 MG/DL (ref 0.55–1.02)
DEPRECATED RDW RBC AUTO: 46.4 FL (ref 35.1–46.3)
EGFRCR SERPLBLD CKD-EPI 2021: 73 ML/MIN/1.73M2 (ref 60–?)
EOSINOPHIL # BLD AUTO: 0 X10(3) UL (ref 0–0.7)
EOSINOPHIL NFR BLD AUTO: 0 %
ERYTHROCYTE [DISTWIDTH] IN BLOOD BY AUTOMATED COUNT: 16.2 % (ref 11–15)
GLOBULIN PLAS-MCNC: 2.5 G/DL (ref 2–3.5)
GLUCOSE BLD-MCNC: 144 MG/DL (ref 70–99)
HCT VFR BLD AUTO: 33.7 % (ref 35–48)
HGB BLD-MCNC: 11 G/DL (ref 12–16)
IMM GRANULOCYTES # BLD AUTO: 0.16 X10(3) UL (ref 0–1)
IMM GRANULOCYTES NFR BLD: 0.9 %
LYMPHOCYTES # BLD AUTO: 1.37 X10(3) UL (ref 1–4)
LYMPHOCYTES NFR BLD AUTO: 7.6 %
MCH RBC QN AUTO: 26 PG (ref 26–34)
MCHC RBC AUTO-ENTMCNC: 32.6 G/DL (ref 31–37)
MCV RBC AUTO: 79.7 FL (ref 80–100)
MONOCYTES # BLD AUTO: 0.51 X10(3) UL (ref 0.1–1)
MONOCYTES NFR BLD AUTO: 2.8 %
NEUTROPHILS # BLD AUTO: 15.95 X10 (3) UL (ref 1.5–7.7)
NEUTROPHILS # BLD AUTO: 15.95 X10(3) UL (ref 1.5–7.7)
NEUTROPHILS NFR BLD AUTO: 88.6 %
OSMOLALITY SERPL CALC.SUM OF ELEC: 291 MOSM/KG (ref 275–295)
PLATELET # BLD AUTO: 267 10(3)UL (ref 150–450)
POTASSIUM SERPL-SCNC: 4.3 MMOL/L (ref 3.5–5.1)
PROT SERPL-MCNC: 6.3 G/DL (ref 5.7–8.2)
RBC # BLD AUTO: 4.23 X10(6)UL (ref 3.8–5.3)
SODIUM SERPL-SCNC: 139 MMOL/L (ref 136–145)
WBC # BLD AUTO: 18 X10(3) UL (ref 4–11)

## 2025-04-09 PROCEDURE — 99239 HOSP IP/OBS DSCHRG MGMT >30: CPT | Performed by: INTERNAL MEDICINE

## 2025-04-09 NOTE — PLAN OF CARE
POD 2. Dressings to incisions intact. Using gel ice pack. On RA. SCDS for DVT prophylaxis. Oxycodone and Valium for pain control. Up with 1 and RW s/p to rolling chair. Per patient getting more strength to (L) leg. Voiding freely up to bathroom.  remains at bedside. Call light within reach. Fall precautions. Plan for home once medically cleared.     Problem: Patient Centered Care  Goal: Patient preferences are identified and integrated in the patient's plan of care  Description: Interventions:- What would you like us to know as we care for you? - Provide timely, complete, and accurate information to patient/family- Incorporate patient and family knowledge, values, beliefs, and cultural backgrounds into the planning and delivery of care- Encourage patient/family to participate in care and decision-making at the level they choose- Honor patient and family perspectives and choices  Outcome: Progressing     Problem: PAIN - ADULT  Goal: Verbalizes/displays adequate comfort level or patient's stated pain goal  Description: INTERVENTIONS:- Encourage pt to monitor pain and request assistance- Assess pain using appropriate pain scale- Administer analgesics based on type and severity of pain and evaluate response- Implement non-pharmacological measures as appropriate and evaluate response- Consider cultural and social influences on pain and pain management- Manage/alleviate anxiety- Utilize distraction and/or relaxation techniques- Monitor for opioid side effects- Notify MD/LIP if interventions unsuccessful or patient reports new pain- Anticipate increased pain with activity and pre-medicate as appropriate  Outcome: Progressing     Problem: DISCHARGE PLANNING  Goal: Discharge to home or other facility with appropriate resources  Description: INTERVENTIONS:- Identify barriers to discharge w/pt and caregiver- Include patient/family/discharge partner in discharge planning- Arrange for needed discharge resources and  transportation as appropriate- Identify discharge learning needs (meds, wound care, etc)- Arrange for interpreters to assist at discharge as needed- Consider post-discharge preferences of patient/family/discharge partner- Complete POLST form as appropriate- Assess patient's ability to be responsible for managing their own health- Refer to Case Management Department for coordinating discharge planning if the patient needs post-hospital services based on physician/LIP order or complex needs related to functional status, cognitive ability or social support system  Outcome: Progressing     Problem: METABOLIC/FLUID AND ELECTROLYTES - ADULT  Goal: Electrolytes maintained within normal limits  Description: INTERVENTIONS:- Monitor labs and rhythm and assess patient for signs and symptoms of electrolyte imbalances- Administer electrolyte replacement as ordered- Monitor response to electrolyte replacements, including rhythm and repeat lab results as appropriate- Fluid restriction as ordered- Instruct patient on fluid and nutrition restrictions as appropriate  Outcome: Progressing     Problem: SKIN/TISSUE INTEGRITY - ADULT  Goal: Skin integrity remains intact  Description: INTERVENTIONS- Assess and document risk factors for pressure ulcer development- Assess and document skin integrity- Monitor for areas of redness and/or skin breakdown- Initiate interventions, skin care algorithm/standards of care as needed  Outcome: Progressing  Goal: Incision(s), wounds(s) or drain site(s) healing without S/S of infection  Description: INTERVENTIONS:- Assess and document risk factors for pressure ulcer development- Assess and document skin integrity- Assess and document dressing/incision, wound bed, drain sites and surrounding tissue- Implement wound care per orders- Initiate isolation precautions as appropriate- Initiate Pressure Ulcer prevention bundle as indicated  Outcome: Progressing     Problem: HEMATOLOGIC - ADULT  Goal: Maintains  hematologic stability  Description: INTERVENTIONS- Assess for signs and symptoms of bleeding or hemorrhage- Monitor labs and vital signs for trends- Administer supportive blood products/factors, fluids and medications as ordered and appropriate- Administer supportive blood products/factors as ordered and appropriate  Outcome: Progressing     Problem: Impaired Functional Mobility  Goal: Achieve highest/safest level of mobility/gait  Description: Interventions:- Assess patient's functional ability and stability- Promote increasing activity/tolerance for mobility and gait- Educate and engage patient/family in tolerated activity level and precaution  Outcome: Progressing

## 2025-04-09 NOTE — PROGRESS NOTES
[facility]  part of [facility]    Progress Note    Eloise Quiñones Patient Status:  Inpatient    1975 MRN [id number]   Location [facility] 4W/SW/SE Attending Steven Garcia MD   Hosp Day # 2 PCP Carlos Madrigal MD     SUBJECTIVE:  Interval History: The patient is doing better this AM.  Pain and strength to LLE improved compared to yesterday, able to bear weight and walk with walker.  Voiding without difficulty.  Some radicular pain to right thigh but improved after steroid.  She denies any fever, chills, gross bowel/bladder incontinence or saddle anesthesia. They also deny calf pain, cramping, chest pain, difficulty breathing or shortness of breath.     Post-Op Day: 2    OBJECTIVE:  Blood pressure 120/65, pulse 50, temperature 98.4 °F (36.9 °C), temperature source Oral, resp. rate 16, height 5' 6\" (1.676 m), weight 210 lb (95.3 kg), last menstrual period 2025, SpO2 96%.     Ortho Spine Exam:  Incisional dressings (left lateral and posterior lumbar) are clean, dry and intact.  Adjacent skin is without erythema or edema.  Motor exam is 4/5 left iliopoas and quadricep and otherwsie 5/5 to bilateral lower extremity.  2+ dorsalis pedis and posterior tibialis pulses.  Sensation is diminished over right anterior thigh but otherwise intact distally. Calves are soft and non-tender to palpation.      ASSESSMENT/PLAN:    S/P L4-5 XLIF, PSF POD2    1) Continue current pain management protocol  2) Plan to discharge home pending clearance from the medicine/hospitalist team and PT/OT  3) Post operative medications were sent to patient's pharmacy outside of [facility]   4) Plan to have patient remove their dressing on POD#3  5) Follow up in clinic in 2 to 3 weeks, appointment should already be scheduled, please have patient call [phone] to confirm or change date/location.   6) All questions answered by the bedside today     TORI ERICKSON NP  [phone]  2025  7:01 AM

## 2025-04-09 NOTE — OCCUPATIONAL THERAPY NOTE
OCCUPATIONAL THERAPY TREATMENT NOTE - INPATIENT    Room Number: 413/413-A           Problem List  Active Problems:    Spinal stenosis of lumbar region with neurogenic claudication    S/P lumbar fusion      OCCUPATIONAL THERAPY ASSESSMENT   Patient demonstrates good  progress this session, goals remain in progress.    Patient is requiring stand-by assist and minimal assist as a result of the following impairments: decreased functional strength and pain.    Patient continues to function below baseline with  self care/ADLs .  Next session anticipate patient to progress lower body dressing, transfers, and dynamic standing balance.  Occupational Therapy will continue to follow patient for duration of hospitalization.    Patient continues to benefit from continued skilled OT services: with no additional skilled services but increased support at home.     PLAN DURING HOSPITALIZATION  OT Device Recommendations: Reacher, Sock aid, Long-handled sponge  OT Treatment Plan: Balance activities, ADL training, Functional transfer training, Endurance training, Patient/Family education, Compensatory technique education     SUBJECTIVE  \"My leg has gotten so much better\"    OBJECTIVE  Precautions: Spine    WEIGHT BEARING RESTRICTION     PAIN ASSESSMENT  Ratin  Location: back  Management Techniques: Ice; Activity promotion    ACTIVITY TOLERANCE                         O2 SATURATIONS       ACTIVITIES OF DAILY LIVING ASSESSMENT  AM-PAC ‘6-Clicks’ Inpatient Daily Activity Short Form  How much help from another person does the patient currently need…  -   Putting on and taking off regular lower body clothing?: A Little  -   Bathing (including washing, rinsing, drying)?: A Little  -   Toileting, which includes using toilet, bedpan or urinal? : A Little  -   Putting on and taking off regular upper body clothing?: None  -   Taking care of personal grooming such as brushing teeth?: None  -   Eating meals?: None    AM-PAC Score:  Score:  21  Approx Degree of Impairment: 32.79%  Standardized Score (AM-PAC Scale): 44.27  CMS Modifier (G-Code): CJ    FUNCTIONAL TRANSFER ASSESSMENT  Sit to Stand: Chair  Edge of Bed: Minimal Assist  Chair: Stand-by Assist  Toilet Transfer: Stand-by Assist    BED MOBILITY  Rolling: Contact Guard Assist  Supine to Sit : Minimal Assist    BALANCE ASSESSMENT  Static Sitting: Supervision  Static Standing: Stand-by Assist    FUNCTIONAL ADL ASSESSMENT  Eating: Independent  Grooming Standing: Stand-by Assist  Bathing Seated: Minimal Assist  UB Dressing Seated: Independent  LB Dressing Seated: Minimal Assist  Toileting Standing: Stand-by Assist    THERAPEUTIC EXERCISE       Skilled Therapy Provided: RN approved session. Pt received in the chair, agreeable to tx. Pt reports \"4/10\" pain in her back. Pt's  present and supportive. Reviewed pt's spine precautions. Pt demo's good awareness of spine precautions. She reports significant improvement in her LLE strength. Pt completed STS from chair with SBA. She ambulated in the room and hallway with SBA and RW. Pt completed toilet transfer with SBA. She returned to the chair to work on LB dressing. Pt was instructed in technique for LB dressing and LB bathing. Pt unable to achieve X-leg at this time-  will assist. Recommend reacher and shower seat. Pt provided with written back care handout. Will follow. Pt with significant progress this session. DC recs upgraded to home with assist from her .      EDUCATION PROVIDED  Patient Education : Role of Occupational Therapy; Plan of Care; Discharge Recommendations; Functional Transfer Techniques  Patient's Response to Education: Verbalized Understanding    The patient's Approx Degree of Impairment: 32.79% has been calculated based on documentation in the [facility] '6 clicks' Inpatient Daily Activity Short Form.  Research supports that patients with this level of impairment may benefit from home.  Final disposition will be made by  interdisciplinary medical team.    Patient End of Session: Up in chair, Needs met, Call light within reach, RN aware of session/findings, All patient questions and concerns addressed, Family present    OT Goals:       Patient will complete functional transfer with mod I  Comment: SBA    Patient will complete toileting with mod I  Comment: SBA    Patient will tolerate standing for 3 minutes in prep for adls with mod I   Comment: progressing    Patient will complete item retrieval with mod I  Comment:ongoing          Goals  on:   Frequency: 3-5x/wk    OT Session  Self-Care Home Management: 15 minutes  Therapeutic Activity: 15 minutes

## 2025-04-09 NOTE — DISCHARGE SUMMARY
[facility]  part of [facility]    DISCHARGE SUMMARY     Eloise Quiñones Patient Status:  Inpatient    1975 MRN [id number]   Location [facility] 4W/SW/SE Attending Steven Garcia MD   Hosp Day # 2 PCP Carlos Madrigal MD     Date of Admission: 2025  Date of Discharge:  2025    Discharge Disposition: ED Dismiss - Never Arrived    Discharge Diagnosis:     Lumbar spinal stenosis s/p L4/5 laminectomy  Tolerated surgery well  IVF, abx, pain meds and DVT ppx per surgery  PRN pain control  PT/OT  Discharge planning    History of Present Illness:     The patient is a 50-year-old  female with chronic back pain, neurogenic claudication, extruded disc with moderate spinal stenosis plus 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 Scheduled today for above-mentioned procedure by her orthopedic surgeon, Dr. Mcfarland. Postoperatively, transferred to PACU for further monitoring.     Brief Synopsis:     Leukocytosis  Likely reactive from surgery  Afebrile   Close opt follow up  Lumbar spinal stenosis s/p L4/5 laminectomy  Tolerated surgery well  IVF, abx, pain meds and DVT ppx per surgery  PRN pain control  PT/OT  Discharge planning    Patient is to remain compliant with all discharge medications and instructions and to follow up as advised.   Patient encouraged to make healthy lifestyle and dietary changes.    Lace+ Score: 41  59-90 High Risk  29-58 Medium Risk  0-28   Low Risk       TCM Follow-Up Recommendation:  LACE 29-58: Moderate Risk of readmission after discharge from the hospital.      Procedures during hospitalization:   Laminectomy    Incidental or significant findings and recommendations (brief descriptions):  None    Lab/Test results pending at Discharge:   None    Consultants:  Consultants         Provider   Role Specialty     Krystal Escobedo MD      Consulting Physician HOSPITALIST            Discharge Medication List:      Discharge Medications        CONTINUE taking these medications        Instructions Prescription details   acetaminophen 500 MG Tabs  Commonly known as: Tylenol Extra Strength      Take 2 tablets (1,000 mg total) by mouth every 6 (six) hours as needed for Pain.   Refills: 0              [place]    Follow-up appointment:   Carlos Madrigal MD  [address] [facility]  [facility] [address] [phone]    Follow up in 1 week(s)      Payton Olivas, NP  [address] [address] [phone]    Follow up in 1 week(s)      Appointments for Next 30 Days 4/9/2025 - 5/9/2025      None            Vital signs:  Temp:  [98.3 °F (36.8 °C)-98.5 °F (36.9 °C)] 98.4 °F (36.9 °C)  Pulse:  [50-82] 82  Resp:  [16-18] 18  BP: (113-132)/(64-74) 132/74  SpO2:  [95 %-98 %] 98 %    Physical Exam:    Gen: NAD AO x3  Chest: good air entry CTABL  CVS: normal s1 and s2 RR  Abd: NABS soft NT ND  Neuro: CN 2-12 grossly intact  Ext: no edema in bilateral LE    -----------------------------------------------------------------------------------------------  PATIENT DISCHARGE INSTRUCTIONS: See electronic chart    Steven Garcia MD  Hospitalist    Time spent:  > 30 minutes    The 21st Century Cures Act makes medical notes like these available to patients in the interest of transparency. Please be advised this is a medical document. Medical documents are intended to carry relevant information, facts as evident, and the clinical opinion of the practitioner. The medical note is intended as peer to peer communication and may appear blunt or direct. It is written in medical language and may contain abbreviations or verbiage that are unfamiliar.

## 2025-04-09 NOTE — PROGRESS NOTES
Patient cleared for discharge home today. All belongings packed and taken with patient. Discharge education provided at bedside. All questions and concerns addressed. Patient wheeled down by wheelchair.

## 2025-04-09 NOTE — PHYSICAL THERAPY NOTE
PHYSICAL THERAPY TREATMENT NOTE - INPATIENT     Room Number: 413/413-A       Presenting Problem: lumbar fusion  Co-Morbidities : Back problem    Hx laparoscopic cholecystectomy   HYPERLIPIDEMIA   Migraines   Visual impairment    glasses    Problem List  Active Problems:    Spinal stenosis of lumbar region with neurogenic claudication    S/P lumbar fusion      PHYSICAL THERAPY ASSESSMENT   Patient demonstrates good  progress this session, goals  remain in progress but have been adequately achieved for safe DC home.     Patient is requiring stand-by assist as a result of the following impairments: decreased functional strength, pain, decreased muscular endurance, and medical status.     Patient continues to function below baseline with bed mobility, transfers, gait, and standing prolonged periods.  Next session anticipate patient to progress bed mobility, transfers, gait, and standing prolonged periods.  Physical Therapy will continue to follow patient for duration of hospitalization.    Patient continues to benefit from continued skilled PT services: with no additional skilled services but increased support at home.    PLAN DURING HOSPITALIZATION  Nursing Mobility Recommendation : 1 Assist  PT Device Recommendation: Rolling walker  PT Treatment Plan: Bed mobility, Body mechanics, Coordination, Endurance, Energy conservation, Gait training, Strengthening, Stair training, Transfer training, Balance training  Frequency (Obs): Daily     SUBJECTIVE  \"I'm so much better than yesterday\"     OBJECTIVE  Precautions: Spine    PAIN ASSESSMENT   Ratin  Location: lower back  Management Techniques: Activity promotion, Body mechanics, Repositioning, Breathing techniques (ice)    BALANCE  Static Sitting: Good  Dynamic Sitting: Fair +  Static Standing: Fair -  Dynamic Standing: Fair -    AM-PAC '6-Clicks' INPATIENT SHORT FORM - BASIC MOBILITY  How much difficulty does the patient currently have...  Patient Difficulty: Turning  over in bed (including adjusting bedclothes, sheets and blankets)?: A Little   Patient Difficulty: Sitting down on and standing up from a chair with arms (e.g., wheelchair, bedside commode, etc.): A Little   Patient Difficulty: Moving from lying on back to sitting on the side of the bed?: A Little   How much help from another person does the patient currently need...   Help from Another: Moving to and from a bed to a chair (including a wheelchair)?: A Little   Help from Another: Need to walk in hospital room?: A Little   Help from Another: Climbing 3-5 steps with a railing?: A Little     AM-PAC Score:  Raw Score: 18   Approx Degree of Impairment: 46.58%   Standardized Score (AM-PAC Scale): 43.63   CMS Modifier (G-Code): CK    FUNCTIONAL ABILITY STATUS  Functional Mobility/Gait Assessment  Gait Assistance:  (SBA)  Distance (ft): 75  Assistive Device: Rolling walker  Pattern:  (decreased rustam speed)  Sit to Stand: stand-by assist    Skilled Therapy Provided: Pt received sitting in chair with  at bedside, agreeable to activity. Pt reported significant improvement in LLE strength and sensation. Continued to have weak hip flexion. Able to stand from chair and to/from toilet with RW and cues given for wide TEO and safe hand placement. Ambulated in the hallway with RW and cues given for posture and practiced toilet transfer. Reiterated education regarding log rolling, spine precautions, and benefits of frequent mobility/ambulation. Pt was left sitting in chair with needs within reach, handoff to RN complete.   Pt cleared for DC home from PT standpoint; if to remain hospitalized will continue to see for IP PT.    The patient's Approx Degree of Impairment: 46.58% has been calculated based on documentation in the [facility] '6 clicks' Inpatient Daily Activity Short Form.  Research supports that patients with this level of impairment may benefit from home with HH PT.  Final disposition will be made by interdisciplinary  medical team.    Patient End of Session: Up in chair, Call light within reach, Needs met, RN aware of session/findings, All patient questions and concerns addressed, Hospital anti-slip socks, Ice applied, Family present    CURRENT GOALS   Goals to be met by: 4/22/25  Patient Goal Patient's self-stated goal is: to return home   Goal #1 Patient is able to demonstrate supine - sit EOB @ level: modified independent      Goal #1   Current Status  In progress   Goal #2 Patient is able to demonstrate transfers EOB to/from Chair/Wheelchair at assistance level: modified independent with  least restrictive device      Goal #2  Current Status In progress   Goal #3 Patient is able to ambulate >50 feet with assist device:  least restrictive device  at assistance level: modified independent   Goal #3   Current Status In progress    Goal #4 Patient will negotiate 2 stairs/one curb w/ assistive device and supervision   Goal #4   Current Status In progress   Goal #5 Patient to demonstrate independence with home activity/exercise instructions provided to patient in preparation for discharge.   Goal #5   Current Status Goal met    Goal #6     Goal #6  Current Status         Therapeutic Activity: 23 minutes

## 2025-04-10 NOTE — PAYOR COMM NOTE
Sending additional clinical for extension of inpatient auth  4/8-4/9    --------------  CONTINUED STAY REVIEW    Payor: [employer]  Subscriber #:  [id number]  Authorization Number: [id number]    Admit date: 4/7/25  Admit time:  9:51 AM    REVIEW DOCUMENTATION:  4/8    Temp:  [97.8 °F (36.6 °C)-98.8 °F (37.1 °C)] 98.6 °F (37 °C)  Pulse:  [52-79] 59  Resp:  [11-27] 16  BP: (101-147)/(52-91) 101/52  SpO2:  [95 %-100 %] 95 %        Physical Exam:    Gen: NAD AO x3  Chest: good air entry CTABL  CVS: normal s1 and s2 RR  Abd: NABS soft NT ND  Neuro: CN 2-12 grossly intact  Ext: no edema in bilateral LE        Diagnostic Data:    Labs:      Recent Labs   Lab 04/08/25  0439   WBC 16.8*   HGB 11.1*   MCV 78.8*   .0         Lumbar spinal stenosis s/p L4/5 laminectomy  Tolerated surgery well  IVF, abx, pain meds and DVT ppx per surgery  PRN pain control  PT/OT  Discharge planning           MEDICATIONS ADMINISTERED IN LAST 1 DAY:  docusate sodium (Colace) cap 100 mg       Date Action Dose Route User    Discharged on 4/9/2025 4/9/2025 0900 Given 100 mg Oral Ping Galaviz RN          oxyCODONE immediate release tab 10 mg       Date Action Dose Route User    Discharged on 4/9/2025 4/9/2025 0846 Given 10 mg Oral Ping Galaviz RN          tiZANidine (Zanaflex) tab 2 mg       Date Action Dose Route User    Discharged on 4/9/2025 4/9/2025 1222 Given 2 mg Oral Ping Galaviz RN            Vitals (last day) before discharge       Date/Time Temp Pulse Resp BP SpO2 Weight O2 Device O2 Flow Rate (L/min) [facility]    04/09/25 0728 -- 82 18 132/74 98 % -- None (Room air) -- NH    04/09/25 0339 98.4 °F (36.9 °C) 50 16 120/65 96 % -- None (Room air) -- AG    04/08/25 2000 98.3 °F (36.8 °C) 56 18 113/64 95 % -- None (Room air) -- DP    04/08/25 1522 98.5 °F (36.9 °C) 73 16 131/67 95 % -- None (Room air) --     04/08/25 1119 98.6 °F (37 °C) 60 16 108/68 98 % -- None (Room air) --     04/08/25 0945 -- 63 -- 117/70 -- -- -- -- GF     25 0931 -- 63 -- 111/70 -- -- -- -- GG    25 0709 98.6 °F (37 °C) 59 16 101/52 95 % -- None (Room air) -- MH    25 0427 97.8 °F (36.6 °C) 55 18 105/55 95 % -- None (Room air) -- LN       --------------  DISCHARGE REVIEW    Payor: [facility] [place] [facility]/MCNP  Subscriber #:  [id number]  Authorization Number: [id number]    Admit date: 25  Admit time:   9:51 AM  Discharge Date: 2025  1:38 PM     Admitting Physician: Jose G Mcfarland MD  Attending Physician:  No att. providers found  Primary Care Physician: Carlos Madrigal MD          Discharge Summary Notes        Discharge Summary signed by Steven Garcia MD at 2025 11:47 AM       Author: Steven Garcia MD Specialty: HOSPITALIST, Internal Medicine Author Type: Physician    Filed: 2025 11:47 AM Date of Service: 2025 11:45 AM Status: Addendum    : Steven Garcia MD (Physician)    Related Notes: Original Note by Steven Garcia MD (Physician) filed at 2025 11:47 AM           [facility]  part of [facility]    DISCHARGE SUMMARY     Eloise Quiñones Patient Status:  Inpatient    1975 MRN [id number]   Location [facility] 4W/SW/SE Attending Steven Garcia MD   Hosp Day # 2 PCP Carlos Madrigal MD     Date of Admission: 2025  Date of Discharge:  2025    Discharge Disposition: ED Dismiss - Never Arrived    Discharge Diagnosis:     Lumbar spinal stenosis s/p L4/5 laminectomy  Tolerated surgery well  IVF, abx, pain meds and DVT ppx per surgery  PRN pain control  PT/OT  Discharge planning    History of Present Illness:     The patient is a 50-year-old  female with chronic back pain, neurogenic claudication, extruded disc with moderate spinal stenosis plus 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 Scheduled today for above-mentioned procedure by her orthopedic surgeon, Dr. Mcfarland. Postoperatively, transferred to PACU for further monitoring.     Brief Synopsis:      Leukocytosis  Likely reactive from surgery  Afebrile   Close opt follow up  Lumbar spinal stenosis s/p L4/5 laminectomy  Tolerated surgery well  IVF, abx, pain meds and DVT ppx per surgery  PRN pain control  PT/OT  Discharge planning    Patient is to remain compliant with all discharge medications and instructions and to follow up as advised.   Patient encouraged to make healthy lifestyle and dietary changes.    Lace+ Score: 41  59-90 High Risk  29-58 Medium Risk  0-28   Low Risk       TCM Follow-Up Recommendation:  LACE 29-58: Moderate Risk of readmission after discharge from the hospital.      Procedures during hospitalization:   Laminectomy    Incidental or significant findings and recommendations (brief descriptions):  None    Lab/Test results pending at Discharge:   None    Consultants:  Consultants         Provider   Role Specialty     Krystal Escobedo MD      Consulting Physician HOSPITALIST            Discharge Medication List:     Discharge Medications        CONTINUE taking these medications        Instructions Prescription details   acetaminophen 500 MG Tabs  Commonly known as: Tylenol Extra Strength      Take 2 tablets (1,000 mg total) by mouth every 6 (six) hours as needed for Pain.   Refills: 0              [place]    Follow-up appointment:   Carlos Madrigal MD  [address] [facility] [address]  [phone]    Follow up in 1 week(s)      Payton Olivas NP  [address] [address] [phone]    Follow up in 1 week(s)      Appointments for Next 30 Days 4/9/2025 - 5/9/2025      None            Vital signs:  Temp:  [98.3 °F (36.8 °C)-98.5 °F (36.9 °C)] 98.4 °F (36.9 °C)  Pulse:  [50-82] 82  Resp:  [16-18] 18  BP: (113-132)/(64-74) 132/74  SpO2:  [95 %-98 %] 98 %    Physical Exam:    Gen: NAD AO x3  Chest: good air entry CTABL  CVS: normal s1 and s2 RR  Abd: NABS soft NT ND  Neuro: CN 2-12 grossly intact  Ext: no edema in bilateral  LE    -----------------------------------------------------------------------------------------------  PATIENT DISCHARGE INSTRUCTIONS: See electronic chart    Steven Garcia MD  Hospitalist    Time spent:  > 30 minutes    The 21st Century Cures Act makes medical notes like these available to patients in the interest of transparency. Please be advised this is a medical document. Medical documents are intended to carry relevant information, facts as evident, and the clinical opinion of the practitioner. The medical note is intended as peer to peer communication and may appear blunt or direct. It is written in medical language and may contain abbreviations or verbiage that are unfamiliar.     Electronically signed by Steven Garcia MD on 4/9/2025 11:47 AM         REVIEWER COMMENTS

## 2025-04-17 NOTE — OPERATIVE REPORT
PATIENT  Eloise Quiñones    DATE OF SURGERY  4/7/2025    SURGEON   Jose G Mcfarland MD    ASSISTANT  АЛЕКСАНДР Goncalves    PREOPERATIVE DIAGNOSIS   Degenerative spondylolisthesis with spinal stenosis L4-5  Lumbar radiculopathy    POSTOPERATIVE DIAGNOSIS   Same     PROCEDURE   1. Anterolateral interbody fusion, L4-5  2. Use of anterior interbody biomechanical cage device, L4-5  3. Posterior spinal instrumentation with fusion L4-5  4. Lumbar laminectomy 4-5  5. Posterior osteotomy L4-5  6. Use of allograft  7. Use of fluoroscopy   8. Neuromonitoring    ANESTHESIA   General     COMPLICATIONS   None immediate     ESTIMATED BLOOD LOSS   50 ml     IMPLANTS   Nuvasive cohere XLIF  Nuvasive reline pedicle screws    DESCRIPTION OF PROCEDURE     INDICATION   The patient presented to the office history of severe back pain   and radicular symptoms has been nonresponsive to extensive   conservative measures. Radiographic workup revealed   a L4-5 with advanced disk   degeneration, spinal stenosis stenosis. At this time, the patient wished to   proceed with surgery. They understand risks and benefits and   risks of failure to improve, neurologic deterioration, failure of   fusion, need for subsequent surgery.     TECHNIQUE   The patient was brought to the operating room. General   anesthesia with endotracheal intubation was performed. The   patient was positioned in the lateral decubitus position with the   left side up. An axillary roll was well placed. Care was taken to ensure all bony prominences were   well padded. The flank was prepped and draped in the usual   fashion. A surgical timeout was performed. The L4-5 disk level   was marked on the lateral radiograph from the patient's skin.     At this time, an incision of the left mid   point of the lateral aspect of the L4-5 disk was made. This was   continued down to fascia with Bovie dissection. The fascia and   the abdominal muscles were penetrated with a curved 6, which   was  advanced into the retroperitoneal free space towards the   finger in the space. Using deep retractors, the psoas muscle was directly visualized.   Sequential dilators were then advanced   under fluoroscopy onto and through the psoas using live EMG   monitoring and no neural stimulation probe was noted. The   guidewire was then advanced into the disk space. The   retractor was then advanced over the dilators and secured to the operating   Table. The base of the surgical field was then probed with EMG   and no neural stimulation was noted.     The disk was incised with a 15-blade. Diskectomy using   curettes as well as grace was performed. Meticulous care was   taken to preserve the end plate. A bowden was advanced through the contra-lateral annulus to release this. At this point,   a distractors were placed into the disk space and    lordotic cage was decided on. A sled placed into the disk space to preserve the   endplates. The cage was then advanced under biplanar fluoroscopy   into the disk space. Excellent disk space distraction was seated   and good position of the cage was noted. The retractor was then   removed. Fluoroscopy confirmed that good straight position and   alignment of the spine. The fascia was then approximated with 0   Vicryl sutures, subcutaneous tissue, and skin was closed and   sterile dressings were applied.       The patient was then positioned prone on the Renzo spinal frame. Care was taken to   ensure bony prominences were well padded. The lower back was   prepped and draped in the usual fashion for surgery and local   anesthetic was infiltrated into the paraspinal area. Under   fluoroscopy, an incision was made immediately lateral to the L4   and L5 pedicles under biplane fluoroscopy, a Jamshidi was   advanced from the lateral aspect of the pedicle down the pedicle   into the vertebral body. A guidewire was then advanced down the   Jamshidi into the vertebral body under biplanar  fluorosopy. This was performed in identical fashion at all  pedicles.       At this point, the incision   connecting the guidewire was made. A tubular retractor was   placed down to the level of L4-5. The lamina and medial facet   were exposed and removed with a audrey. The facet was completely removed for posterior osteotomy for decompression and alignment purposes. The ligamentum flavum   was removed piecemeal and the dura was exposed. Excellent   hemostaiss was achieved. The facet and pars was decorticated at   L4-5 and bone graft both autograft and allograft was left for posterior spinal fusion.     Pedicle screws were then advanced over the guidewire under fluoroscopy. In   addition, live EMG monitoring was performed as the screws were   Advanced. No neural stimulation was   noted. All pedicle screws were well positioned in the same   fashion. A adri was then advanced down the connectors   into the heads of the pedicle screws. The set screws   were then fastened securely. The extenders were then removed and   the pedicle screw adri constructs was seen to be well positioned   on fluoroscopy.     The wound was thoroughly irrigated. Hemostasis was ensured.   The fascia was approximated with an 0 Vicryl suture, subcutaneous   tissue, and skin was then approximated. Sterile dressing   applied. The patient returned to recovery in stable condition.

## (undated) DEVICE — SHEET,DRAPE,53X77,STERILE: Brand: MEDLINE

## (undated) DEVICE — LIGAMAX 5 MM ENDOSCOPIC MULTIPLE CLIP APPLIER: Brand: LIGAMAX

## (undated) DEVICE — 3M™ TEGADERM™ TRANSPARENT FILM DRESSING FRAME STYLE, 1626W, 4 IN X 4-3/4 IN (10 CM X 12 CM), 50/CT 4CT/CASE: Brand: 3M™ TEGADERM™

## (undated) DEVICE — LAP CHOLE/APPY CDS-LF: Brand: MEDLINE INDUSTRIES, INC.

## (undated) DEVICE — TROCAR: Brand: KII SHIELDED BLADED ACCESS SYSTEM

## (undated) DEVICE — WRAP COOLING BACK W/NO PILLOW

## (undated) DEVICE — GLOVE SUR 7 SENSICARE PI MIC PIP CRM PWD F

## (undated) DEVICE — KENDALL SCD EXPRESS SLEEVES, KNEE LENGTH, MEDIUM: Brand: KENDALL SCD

## (undated) DEVICE — INSUFFLATION NEEDLE TO ESTABLISH PNEUMOPERITONEUM.: Brand: INSUFFLATION NEEDLE

## (undated) DEVICE — KIT SUR ACCS MAXCESS

## (undated) DEVICE — INSULATED BLADE ELECTRODE;CAUTION: FOR MANUFACTURING, PROCESSING, OR REPACKING.: Brand: EDGE

## (undated) DEVICE — 1010 S-DRAPE TOWEL DRAPE 10/BX: Brand: STERI-DRAPE™

## (undated) DEVICE — INTENDED USE FOR SURGICAL MARKING ON INTACT SKIN, ALSO PROVIDES A PERMANENT METHOD OF IDENTIFYING OBJECTS IN THE OPERATING ROOM: Brand: WRITESITE® PLUS MINI PREP RESISTANT MARKER

## (undated) DEVICE — MONITORING NEUROPHYSIOLOGICAL

## (undated) DEVICE — ENDOPATH XCEL DILATING TIP TROCARS WITH STABILITY SLEEVES: Brand: ENDOPATH XCEL

## (undated) DEVICE — SYRINGE MED 3ML STD CLR PLAS LL TIP N CTRL

## (undated) DEVICE — PAD,NON-ADHERENT,3X8,STERILE,LF,1/PK: Brand: MEDLINE

## (undated) DEVICE — HEMOSTAT KIT SURGIFLO THROM 8ML

## (undated) DEVICE — SUTURE VICRYL 5-0 PC-1

## (undated) DEVICE — OPEN-END FLEXI-TIP URETERAL CATHETER: Brand: FLEXI-TIP

## (undated) DEVICE — SLIM BODY SKIN STAPLER: Brand: APPOSE ULC

## (undated) DEVICE — DRAPE C-ARM UNIVERSAL

## (undated) DEVICE — NON-ADHERENT DRESSING: Brand: TELFA

## (undated) DEVICE — MONOFILAMENT ABSORBABLE SUTURE: Brand: MAXON

## (undated) DEVICE — GLOVE SUR 6.5 SENSICARE PI PIP GRN PWD F

## (undated) DEVICE — ZIPWIRE GUIDEWIRE .035X150 STR

## (undated) DEVICE — GAUZE,SPONGE,4"X4",12PLY,WOVEN,NS,LF: Brand: MEDLINE

## (undated) DEVICE — SUT COAT VCRL 2-0 18IN CT-1 ABSRB UD CR 36MM

## (undated) DEVICE — TRAY INSTR PWR NUVASIVE

## (undated) DEVICE — GAUZE,SPONGE,4"X4",16PLY,XRAY,STRL,LF: Brand: MEDLINE

## (undated) DEVICE — ANTIBACTERIAL UNDYED BRAIDED (POLYGLACTIN 910), SYNTHETIC ABSORBABLE SUTURE: Brand: COATED VICRYL

## (undated) DEVICE — SOL  .9 1000ML BTL

## (undated) DEVICE — VIOLET BRAIDED (POLYGLACTIN 910), SYNTHETIC ABSORBABLE SUTURE: Brand: COATED VICRYL

## (undated) DEVICE — SPK10329 JACKSON KIT: Brand: SPK10329 JACKSON KIT

## (undated) DEVICE — INSULATED BLADE ELECTRODE: Brand: EDGE

## (undated) DEVICE — 3.0MM PRECISION NEURO (MATCH HEAD)

## (undated) DEVICE — NEEDLE NRV STIM BVL TIP INSUL PEDCL ACCS SYS

## (undated) DEVICE — 3M™ IOBAN™ 2 ANTIMICROBIAL INCISE DRAPE 6650EZ: Brand: IOBAN™ 2

## (undated) DEVICE — SUT COAT VCRL+ 0 27IN UR-6 ABSRB VLT ANTIBACT

## (undated) DEVICE — ZZ-CONVERTED-TO-522442- SPONGE 4X4 10PK

## (undated) DEVICE — GOWN,SIRUS,FABRNF,RAGLAN,XL,ST,28/CS: Brand: MEDLINE

## (undated) DEVICE — KIT DIL FOR EXTRM LUM IF NEUROVISION M5 XLIF

## (undated) DEVICE — GLOVE SUR 8.5 SENSICARE PI MIC PIP CRM PWD F

## (undated) DEVICE — STERILE POLYISOPRENE POWDER-FREE SURGICAL GLOVES: Brand: PROTEXIS

## (undated) DEVICE — SOLUTION IRRIG 1000ML 0.9% NACL USP BTL

## (undated) DEVICE — CHLORAPREP 26ML APPLICATOR

## (undated) DEVICE — GOWN,SIRUS,FABRNF,RAGLAN,L,ST,30/CS: Brand: MEDLINE

## (undated) DEVICE — ENDOPATH XCEL UNIVERSAL TROCAR STABLILITY SLEEVES: Brand: ENDOPATH XCEL

## (undated) DEVICE — GLOVE SUR 7 SENSICARE PI PIP GRN PWD F

## (undated) DEVICE — TROCAR: Brand: KII® SLEEVE

## (undated) DEVICE — SUT MCRYL 3-0 18IN PS-2 ABSRB UD 19MM 3/8 CIR

## (undated) DEVICE — Device

## (undated) DEVICE — PACK CDS LAMINECTOMY

## (undated) DEVICE — C-ARMOR C-ARM EQUIPMENT COVERS CLEAR STERILE UNIVERSAL FIT 12 PER CASE: Brand: C-ARMOR

## (undated) DEVICE — PACK ANTERIOR POSTERIOR ADD ON

## (undated) DEVICE — GLOVE SUR 6.5 SENSICARE PI MIC PIP CRM PWD F

## (undated) DEVICE — ADHESIVE SKIN TOP FOR WND CLSR DERMBND ADV

## (undated) NOTE — ED AVS SNAPSHOT
[facility] Emergency Department    [place]  [address] [address]    Phone:  [phone]    Fax:  Jd Ara[address].   MRN: [id number]    Department:  [facility] Emergency Department   Date of Visit:  4/30/2 IF THERE IS ANY CHANGE OR WORSENING OF YOUR CONDITION, CALL YOUR PRIMARY CARE PHYSICIAN AT ONCE OR RETURN IMMEDIATELY TO THE EMERGENCY DEPARTMENT.     If you have been prescribed any medication(s), please fill your prescription right away and begin taking t

## (undated) NOTE — ED AVS SNAPSHOT
[facility] Emergency Department    [facility]  [address] [address]    Phone:  [phone]    Fax:  Jd Araiza [address].   MRN: [id number]    Department:  [facility] Emergency Department   Date of Visit:  1/11/2 Ibuprofen 800 mg every 6-8 hours and/or acetaminophen 1000 mg every 4-6 hours as needed for pain. Flexeril 3 times a day as needed for muscle spasms. Followup with PMD if not improved in 1 week. Return immediately if increased concerns.     Discharge R [facility] Emergency Department. Follow-up care is at the discretion of that Physician. IF THERE IS ANY CHANGE OR WORSENING OF YOUR CONDITION, CALL YOUR PRIMARY CARE PHYSICIAN AT ONCE OR RETURN IMMEDIATELY TO THE EMERGENCY DEPARTMENT.     If you hav harming yourself, contact [facility] at [phone]. - If you don’t have insurance, [facility] has partnered with [facility] [address] to help you get signed up for insurance coverage.   [facility]

## (undated) NOTE — LETTER
10/9/2017    Return to School / Work    Name: Myla Silva        : 1975    To Whom It May Concern,    Myla Silva had surgery on 18 and is:    Able to return to school / work without restrictions on 10/09/2017.     Comments:    If there are

## (undated) NOTE — ED AVS SNAPSHOT
Tyshawn Alvarado   MRN: [id number]    Department:  [facility] Emergency Department   Date of Visit:  11/18/2019           Disclosure     Insurance plans vary and the physician(s) referred by the ER may not be covered by your plan.  Please contact your tell this physician (or your personal doctor if your instructions are to return to your personal doctor) about any new or lasting problems. The primary care or specialist physician will see patients referred from the [facility] Emergency Department.  Stephanie Reina

## (undated) NOTE — LETTER
10/09/17    Patient: Wilmer Alex  : 1975 Visit date: 10/9/2017    Dear  Dr. Romero Muller,    Thank you for referring Wilmer Alex to my practice. Please find my assessment and plan below.              Assessment   Follow-up examination after abdominal

## (undated) NOTE — LETTER
17    Patient: Romayne Kenner  : 1975 Visit date: 2017    Dear  Dr. Swetha Ambrocio presented to my office today for evaluation of right upper quadrant abdominal pain. Please find my assessment and plan below.             Assessm If her pain persists until the time of the scheduled surgery, we will proceed with surgery, keeping in mind that her symptoms do not classically fit pain associated with gallbladder pathology.   Should her symptoms acutely worsen i.e. should she have fever,

## (undated) NOTE — ED AVS SNAPSHOT
[facility] Emergency Department    [place]  [address] [address]    Phone:  [phone]    Fax:  [address]   MRN: [id number]    Department:  [facility] Emergency Department   Date of Visit:  4/30/2 To Check ER Wait Times:  TEXT 'ERwait' to 18030      Click [email or web address]      Or call [phone]    If you have any problems with your follow-up, please call our  at [phone]    Si usted tiene algun problema con rosaod sequimiento, por f I have read and understand the instructions given to me by my caregivers. 24-Hour Pharmacies        Pharmacy Address Phone Number   [address] ([address]) [address] If you've recently had a stay at the Hospital you can access your discharge instructions in [facility] by going to Visits < Admission Summaries.  If you've been to the Emergency Department or your doctor's office, you can view your past visit information in My

## (undated) NOTE — ED AVS SNAPSHOT
Constantino Goldsmith   MRN: [id number]    Department:  [facility] Emergency Department   Date of Visit:  9/18/2017           Disclosure     Insurance plans vary and the physician(s) referred by the ER may not be covered by your plan.  Please contact your If you have been prescribed any medication(s), please fill your prescription right away and begin taking the medication(s) as directed    If the emergency physician has read X-rays, these will be re-interpreted by a radiologist.  If there is a significant

## (undated) NOTE — ED AVS SNAPSHOT
[facility] Emergency Department    [place]  [address] [address]    Phone:  [phone]    Fax:  Jd Ara[address]   MRN: [id number]    Department:  [facility] Emergency Department   Date of Visit:  1/11/2 IF THERE IS ANY CHANGE OR WORSENING OF YOUR CONDITION, CALL YOUR PRIMARY CARE PHYSICIAN AT ONCE OR RETURN IMMEDIATELY TO THE EMERGENCY DEPARTMENT.     If you have been prescribed any medication(s), please fill your prescription right away and begin taking t